# Patient Record
Sex: FEMALE | Race: BLACK OR AFRICAN AMERICAN | Employment: UNEMPLOYED | URBAN - METROPOLITAN AREA
[De-identification: names, ages, dates, MRNs, and addresses within clinical notes are randomized per-mention and may not be internally consistent; named-entity substitution may affect disease eponyms.]

---

## 2019-10-10 ENCOUNTER — HOSPITAL ENCOUNTER (OUTPATIENT)
Age: 39
Discharge: HOME | End: 2019-10-12

## 2019-10-10 ENCOUNTER — OFFICE VISIT (OUTPATIENT)
Dept: OBGYN | Age: 39
End: 2019-10-10

## 2019-10-10 VITALS
BODY MASS INDEX: 30.64 KG/M2 | SYSTOLIC BLOOD PRESSURE: 135 MMHG | HEART RATE: 75 BPM | HEIGHT: 59 IN | DIASTOLIC BLOOD PRESSURE: 80 MMHG | WEIGHT: 152 LBS

## 2019-10-10 DIAGNOSIS — R10.2 PELVIC PAIN: ICD-10-CM

## 2019-10-10 DIAGNOSIS — Z12.4 SCREENING FOR CERVICAL CANCER: ICD-10-CM

## 2019-10-10 DIAGNOSIS — Z12.31 ENCOUNTER FOR SCREENING MAMMOGRAM FOR BREAST CANCER: ICD-10-CM

## 2019-10-10 DIAGNOSIS — Z01.419 WOMEN'S ANNUAL ROUTINE GYNECOLOGICAL EXAMINATION: Primary | ICD-10-CM

## 2019-10-10 PROCEDURE — 99203 OFFICE O/P NEW LOW 30 MIN: CPT | Performed by: OBSTETRICS & GYNECOLOGY

## 2019-10-10 PROCEDURE — G0123 SCREEN CERV/VAG THIN LAYER: HCPCS

## 2019-10-10 PROCEDURE — 99386 PREV VISIT NEW AGE 40-64: CPT | Performed by: OBSTETRICS & GYNECOLOGY

## 2019-10-22 ENCOUNTER — HOSPITAL ENCOUNTER (OUTPATIENT)
Dept: ULTRASOUND IMAGING | Age: 39
Discharge: HOME | End: 2019-10-24

## 2019-10-22 DIAGNOSIS — R10.2 PELVIC PAIN: ICD-10-CM

## 2019-10-22 PROCEDURE — 76830 TRANSVAGINAL US NON-OB: CPT

## 2019-10-22 PROCEDURE — 76856 US EXAM PELVIC COMPLETE: CPT

## 2019-10-24 ENCOUNTER — OFFICE VISIT (OUTPATIENT)
Dept: OBGYN | Age: 39
End: 2019-10-24

## 2019-10-24 VITALS
SYSTOLIC BLOOD PRESSURE: 116 MMHG | DIASTOLIC BLOOD PRESSURE: 83 MMHG | HEART RATE: 72 BPM | WEIGHT: 154 LBS | BODY MASS INDEX: 31.1 KG/M2

## 2019-10-24 DIAGNOSIS — N93.9 ABNORMAL UTERINE BLEEDING: Primary | ICD-10-CM

## 2019-10-24 PROCEDURE — 99213 OFFICE O/P EST LOW 20 MIN: CPT | Performed by: OBSTETRICS & GYNECOLOGY

## 2019-10-24 PROCEDURE — 99212 OFFICE O/P EST SF 10 MIN: CPT | Performed by: OBSTETRICS & GYNECOLOGY

## 2019-11-19 ENCOUNTER — OFFICE VISIT (OUTPATIENT)
Dept: FAMILY MEDICINE CLINIC | Age: 39
End: 2019-11-19

## 2019-11-19 VITALS
BODY MASS INDEX: 28.89 KG/M2 | SYSTOLIC BLOOD PRESSURE: 110 MMHG | HEIGHT: 61 IN | OXYGEN SATURATION: 98 % | DIASTOLIC BLOOD PRESSURE: 74 MMHG | HEART RATE: 100 BPM | WEIGHT: 153 LBS | TEMPERATURE: 97.8 F

## 2019-11-19 DIAGNOSIS — J06.9 UPPER RESPIRATORY TRACT INFECTION, UNSPECIFIED TYPE: Primary | ICD-10-CM

## 2019-11-19 DIAGNOSIS — H66.003 NON-RECURRENT ACUTE SUPPURATIVE OTITIS MEDIA OF BOTH EARS WITHOUT SPONTANEOUS RUPTURE OF TYMPANIC MEMBRANES: ICD-10-CM

## 2019-11-19 PROCEDURE — 99213 OFFICE O/P EST LOW 20 MIN: CPT | Performed by: PHYSICIAN ASSISTANT

## 2019-11-19 RX ORDER — AMOXICILLIN 875 MG/1
875 TABLET, COATED ORAL 2 TIMES DAILY
Qty: 20 TABLET | Refills: 0 | Status: SHIPPED | OUTPATIENT
Start: 2019-11-19 | End: 2019-11-29

## 2019-11-19 ASSESSMENT — ENCOUNTER SYMPTOMS
SORE THROAT: 0
SHORTNESS OF BREATH: 0
BACK PAIN: 0
PHOTOPHOBIA: 0
VOMITING: 0
NAUSEA: 0
DIARRHEA: 0
ABDOMINAL PAIN: 0
COUGH: 1

## 2019-12-11 ENCOUNTER — HOSPITAL ENCOUNTER (OUTPATIENT)
Dept: GENERAL RADIOLOGY | Age: 39
Discharge: HOME | End: 2019-12-13

## 2019-12-11 DIAGNOSIS — Z12.31 ENCOUNTER FOR SCREENING MAMMOGRAM FOR BREAST CANCER: ICD-10-CM

## 2019-12-11 PROCEDURE — 77063 BREAST TOMOSYNTHESIS BI: CPT

## 2019-12-17 DIAGNOSIS — R92.2 DENSE BREAST TISSUE ON MAMMOGRAM: Primary | ICD-10-CM

## 2020-03-19 ENCOUNTER — HOSPITAL ENCOUNTER (OUTPATIENT)
Dept: GENERAL RADIOLOGY | Age: 40
Discharge: HOME | End: 2020-03-21

## 2020-03-19 PROCEDURE — 76641 ULTRASOUND BREAST COMPLETE: CPT

## 2020-03-25 ENCOUNTER — OFFICE VISIT (OUTPATIENT)
Dept: INTERNAL MEDICINE | Age: 40
End: 2020-03-25

## 2020-03-25 ENCOUNTER — HOSPITAL ENCOUNTER (OUTPATIENT)
Age: 40
Discharge: HOME | End: 2020-03-25

## 2020-03-25 VITALS
DIASTOLIC BLOOD PRESSURE: 82 MMHG | BODY MASS INDEX: 28.89 KG/M2 | SYSTOLIC BLOOD PRESSURE: 119 MMHG | HEART RATE: 82 BPM | RESPIRATION RATE: 16 BRPM | TEMPERATURE: 98.1 F | HEIGHT: 61 IN | WEIGHT: 153 LBS

## 2020-03-25 LAB
HBA1C MFR BLD: 6 %
TSH SERPL DL<=0.05 MIU/L-ACNC: 2.77 UIU/ML (ref 0.27–4.2)

## 2020-03-25 PROCEDURE — 93010 ELECTROCARDIOGRAM REPORT: CPT | Performed by: INTERNAL MEDICINE

## 2020-03-25 PROCEDURE — 84443 ASSAY THYROID STIM HORMONE: CPT

## 2020-03-25 PROCEDURE — 99204 OFFICE O/P NEW MOD 45 MIN: CPT | Performed by: INTERNAL MEDICINE

## 2020-03-25 PROCEDURE — 83036 HEMOGLOBIN GLYCOSYLATED A1C: CPT | Performed by: INTERNAL MEDICINE

## 2020-03-25 PROCEDURE — 93005 ELECTROCARDIOGRAM TRACING: CPT | Performed by: INTERNAL MEDICINE

## 2020-03-25 PROCEDURE — 99202 OFFICE O/P NEW SF 15 MIN: CPT | Performed by: INTERNAL MEDICINE

## 2020-03-25 PROCEDURE — 36415 COLL VENOUS BLD VENIPUNCTURE: CPT

## 2020-03-25 RX ORDER — ALBUTEROL SULFATE 90 UG/1
2 AEROSOL, METERED RESPIRATORY (INHALATION) 4 TIMES DAILY PRN
Qty: 1 INHALER | Refills: 0 | Status: SHIPPED
Start: 2020-03-25 | End: 2020-04-29 | Stop reason: SDUPTHER

## 2020-03-25 RX ORDER — LEVOTHYROXINE SODIUM 0.1 MG/1
100 TABLET ORAL DAILY
Qty: 30 TABLET | Refills: 0 | Status: SHIPPED
Start: 2020-03-25 | End: 2020-04-29 | Stop reason: SDUPTHER

## 2020-03-25 RX ORDER — OMEPRAZOLE 40 MG/1
40 CAPSULE, DELAYED RELEASE ORAL DAILY
Qty: 45 CAPSULE | Refills: 0 | Status: SHIPPED
Start: 2020-03-25 | End: 2020-04-29 | Stop reason: SDUPTHER

## 2020-03-25 RX ORDER — ALBUTEROL SULFATE 90 UG/1
2 AEROSOL, METERED RESPIRATORY (INHALATION) 4 TIMES DAILY PRN
Qty: 1 INHALER | Refills: 1 | Status: SHIPPED
Start: 2020-03-25 | End: 2020-03-25

## 2020-03-25 SDOH — ECONOMIC STABILITY: INCOME INSECURITY: HOW HARD IS IT FOR YOU TO PAY FOR THE VERY BASICS LIKE FOOD, HOUSING, MEDICAL CARE, AND HEATING?: SOMEWHAT HARD

## 2020-03-25 SDOH — ECONOMIC STABILITY: FOOD INSECURITY: WITHIN THE PAST 12 MONTHS, THE FOOD YOU BOUGHT JUST DIDN'T LAST AND YOU DIDN'T HAVE MONEY TO GET MORE.: NEVER TRUE

## 2020-03-25 SDOH — ECONOMIC STABILITY: FOOD INSECURITY: WITHIN THE PAST 12 MONTHS, YOU WORRIED THAT YOUR FOOD WOULD RUN OUT BEFORE YOU GOT MONEY TO BUY MORE.: OFTEN TRUE

## 2020-03-25 ASSESSMENT — ENCOUNTER SYMPTOMS
ALLERGIC/IMMUNOLOGIC NEGATIVE: 1
GASTROINTESTINAL NEGATIVE: 1
SHORTNESS OF BREATH: 1
EYES NEGATIVE: 1
VOICE CHANGE: 1

## 2020-03-25 ASSESSMENT — PATIENT HEALTH QUESTIONNAIRE - PHQ9
SUM OF ALL RESPONSES TO PHQ QUESTIONS 1-9: 0
SUM OF ALL RESPONSES TO PHQ9 QUESTIONS 1 & 2: 0
1. LITTLE INTEREST OR PLEASURE IN DOING THINGS: 0
2. FEELING DOWN, DEPRESSED OR HOPELESS: 0
SUM OF ALL RESPONSES TO PHQ QUESTIONS 1-9: 0

## 2020-03-25 NOTE — PATIENT INSTRUCTIONS
por trejo uso de esta información. Patient Education        Hipotiroidismo: Instrucciones de cuidado  Hypothyroidism: Care Instructions  Instrucciones de cuidado    Usted tiene hipotiroidismo, lo que significa que trejo organismo no está fabricando suficiente hormona tiroidea. Esta hormona ayuda a que trejo cuerpo use la energía. Si trejo nivel de hormona tiroidea está bajo, usted podría sentirse cansado, estar estreñido, tener aumento de presión arterial, o tener piel seca o problemas de [place]. También podría tener frío con facilidad, aun cuando el clima sea caluroso. Las mujeres con bajo nivel de tiroides quizás experimenten períodos menstruales abundantes. Se [place] un análisis de edward para detectar trejo nivel de hormona estimulante de tiroides (TSH, por bjorn siglas en inglés) y comprobar el hipotiroidismo. Un nivel alto de TSH podría significar que usted tiene [address] de tiroides bajo. Cuando trejo cuerpo no está produciendo suficiente hormona tiroidea, el nivel de TSH se eleva tratando de hacer que el cuerpo [address]. El tratamiento para el hipotiroidismo es thaddeus pastillas de hormona tiroidea. Mechelle Bur a sentirse mejor en 1 a 2 semanas. Vilma pueden pasar varios meses para oneil cambios en el nivel de TSH. Necesita consultas regulares con trejo médico para asegurarse de que esté tomando la dosis correcta de [place]. La mayoría de las personas necesitarán tratamiento por el steph de bjorn vidas. Nelli necesitará consultar regularmente al médico para hacerse análisis de [place] y asegurarse de que esté respondiendo prince. La atención de seguimiento es jeri parte clave de trejo tratamiento y seguridad. Asegúrese de hacer y acudir a todas las citas, y llame a trejo médico si está teniendo problemas. También es jeri buena idea saber los resultados de bjorn exámenes y mantener jeri lista de los medicamentos que stephanie. ¿Cómo puede cuidarse en el hogar?   · Media trejo medicamento de hormona tiroidea exactamente [employer]	[place] fue indicado. Llame a trejo médico si griselda estar teniendo problemas con trejo medicamento. La mayoría de las personas no tienen efectos secundarios si harmony con regularidad la cantidad correcta de medicamento. ? Ceiba el medicamento 30 minutos antes del desayuno y no lo lino junto con calcio, vitaminas o bryan.  ? No intente thaddeus dosis adicionales de trejo medicamento para la tiroides. Eso no lo ayuda a sentirse mejor más pronto y podría provocar efectos secundarios. ? Si olvida thaddeus jeri dosis, NO tome jeri dosis doble del medicamento. Ceiba la dosis normal al día siguiente. · Infórmele a trejo médico sobre todos los productos recetados, herbales o de venta jarvis que tome. · Cuídese. Siga jeri dieta saludable, duerma lo suficiente y lino ejercicio con regularidad. ¿Cuándo debe pedir ayuda? Llame al 911 en cualquier momento que considere que necesita atención de emergencia. Por ejemplo, llame si:    · Se desmayó (perdió el conocimiento).     · Tiene graves dificultades para respirar.     · Tiene un ritmo cardíaco demasiado bajo (menos de 60 latidos por minuto).     · Tiene jeri temperatura corporal baja (95°F [35°C] o waylon).    Llame a trejo médico ahora mismo o busque atención médica inmediata si:    · Se siente cansado, perezoso o débil.     · Tiene dificultades para recordar cosas o concentrarse.     · No empieza a sentirse mejor 2 semanas después de shine empezado a thaddeus el medicamento.    Preste especial atención a los cambios en trejo iván y asegúrese de comunicarse con trejo médico si tiene algún problema. ¿Dónde puede encontrar más información en inglés? Genice Danielle a [email or web address] e ingrese a trejo cuenta de MyChart. Dinora Garcia C371 en el Luis Pierre \"Search Health Information\" para más información (en inglés) sobre \"Hipotiroidismo: Instrucciones de cuidado. \"     Si no tiene jeri cuenta, lino jessica en el enlace \"Sign Up Now\".   Revisado: 28 julio, 2019Versión del contenido: 12.4  © 4494-5566 Healthwise, para ir al baño, llame a la enfermera antes de que tenga que ir urgentemente. Si recibe líquidos por vena (IV), mustapha vez tenga que ir al baño con más frecuencia. Cosas que el personal del hospital puede hacer para prevenir las caídas   Mantener cerca de usted los artículos necesarios. Trejo teléfono, la lupillo o el botón para llamar a la enfermera y cualquier cosa que necesite para ayudarse a caminar deberían estar cerca de usted. Mantener trejo cama baja y [place]. Trejo cama debería estar lo suficientemente baja mary para que no tenga problemas para levantarse. Las chet de trejo cama deben estar trabadas para que la cama no pueda moverse. Explicarle lo que es seguro. Trejo médico o enfermera le dirá lo que puede hacer con seguridad y con qué frecuencia necesita levantarse y andar. Mantener trejo habitación limpia y [place]. Trejo habitación no debería tener zonas mojadas ni resbaladizas. No debería shine nada obstruyendo el mario al baño o al pasillo. Iluminar la habitación. Trejo habitación debería tener jeri buena iluminación. Asegúrese de que haya jeri lupillo de noche en trejo baño. Revisado: 21 agosto, 2019Versión del contenido: 12.4  © 7287-9718 Healthwise, Incorporated. Las instrucciones de cuidado fueron adaptadas bajo licencia por [facility] ([facility]). Si usted tiene Alva Kingfisher afección médica o sobre estas instrucciones, siempre pregunte a trejo profesional de iván. WiiiWaaa, [employer] niega toda garantía o responsabilidad por trejo uso de esta información.

## 2020-03-25 NOTE — PROGRESS NOTES
[facility]  InternalMedicine Residency Program  ACC Note      SUBJECTIVE:  CC: had concerns including New Patient; Pharyngitis (pain in throat for 5 months with change in voice); and Hypothyroidism (on 100mcgs of levothyroxine daily). HPI:Jose Lucas presented to the [facility] for a routine visit. PMHx of hypothyroidsim, with family hx of DM in both parents and brothers, came with complains of change in the voice since November 2019, tried abx from [employer], did not benefit her, and she also has reflux, and epigastric pain, her Hba1c today is 6.0, explained her benefits of weight loss and exercise. Review of Systems   Constitutional: Negative. HENT: Positive for voice change. Eyes: Negative. Respiratory: Positive for shortness of breath. Cardiovascular: Positive for palpitations. Gastrointestinal: Negative. Endocrine: Negative. Genitourinary: Negative. Musculoskeletal: Negative. Allergic/Immunologic: Negative. Neurological: Negative. Hematological: Negative. Psychiatric/Behavioral: Negative. Current Outpatient Medications on File Prior to Visit   Medication Sig Dispense Refill    acetaminophen (TYLENOL) 500 MG tablet Take 1 tablet by mouth every 6 hours as needed for Pain Maximum dose- 8 tablets/24 hours. (Patient not taking: Reported on 3/25/2020) 120 tablet 2     No current facility-administered medications on file prior to visit. OBJECTIVE:    VS: /82 (Site: Left Upper Arm, Position: Sitting, Cuff Size: Medium Adult)   Pulse 82   Temp 98.1 °F (36.7 °C) (Oral)   Resp 16   Ht 5' 1\" (1.549 m)   Wt 153 lb (69.4 kg)   BMI 28.91 kg/m²   Physical Exam  HENT:      Head: Normocephalic. Nose: Nose normal.   Eyes:      Conjunctiva/sclera: Conjunctivae normal.   Cardiovascular:      Rate and Rhythm: Normal rate. Pulmonary:      Effort: Pulmonary effort is normal.      Breath sounds: Normal breath sounds.    Abdominal: General: Bowel sounds are normal.   Musculoskeletal: Normal range of motion. Skin:     General: Skin is warm and dry. Neurological:      General: No focal deficit present. Mental Status: She is alert and oriented to person, place, and time. Psychiatric:         Mood and Affect: Mood normal.         Thought Content: Thought content normal.         ASSESSMENT/PLAN:    I have reviewed all pertient PMHx, PSHx, FamHx, Social Hx, medications, and allergies andupdated history as appropriate. Kiki Mancini was seen today for new patient, pharyngitis and hypothyroidism. Diagnoses and all orders for this visit:    Screening for diabetes mellitus  -     POCT glycosylated hemoglobin (Hb A1C)  -explained weight loss and exercise    Hypothyroidism, unspecified type  -     levothyroxine (LEVOTHROID) 100 MCG tablet; Take 1 tablet by mouth daily  -     TSH; Future  -will follow TSH and adjust dose as needed    Gastroesophageal reflux disease without esophagitis/hoarseness of voice  -     omeprazole (PRILOSEC) 40 MG delayed release capsule; Take 1 capsule by mouth daily  -will follow in 6 weeks    Palpitations  -     EKG 12 Lead  -EKG with in normal limitis    SOB (shortness of breath) on exertion  -     albuterol sulfate HFA (VENTOLIN HFA) 108 (90 Base) MCG/ACT inhaler;  Inhale 2 puffs into the lungs 4 times daily as needed for Wheezing      HCM  hba1c - 6    RTC:     I have reviewed my findings andrecommendations with Roddy Sam and Dr Pillo Slade M.D., PGY1  3/25/2020 9:49 AM

## 2020-03-25 NOTE — PROGRESS NOTES
Pt screened positive for SDOH related to financial strain and or food insecurity and declined further contact for assessment/resources.  Khmer interpretor Shikha Weston present for interpreting for the doctor and the nurse  Medication changes reviewed with patient  Printed lab script given to pt with instructions  Patient given instructions. Understanding verbalized.  Fu appointment scheduled and reviewed with pt

## 2020-04-29 ENCOUNTER — OFFICE VISIT (OUTPATIENT)
Dept: INTERNAL MEDICINE | Age: 40
End: 2020-04-29

## 2020-04-29 VITALS
TEMPERATURE: 98.3 F | BODY MASS INDEX: 30.66 KG/M2 | DIASTOLIC BLOOD PRESSURE: 86 MMHG | SYSTOLIC BLOOD PRESSURE: 121 MMHG | HEART RATE: 78 BPM | RESPIRATION RATE: 18 BRPM | WEIGHT: 152.1 LBS | HEIGHT: 59 IN | OXYGEN SATURATION: 100 %

## 2020-04-29 PROCEDURE — 99214 OFFICE O/P EST MOD 30 MIN: CPT | Performed by: PHYSICAL MEDICINE & REHABILITATION

## 2020-04-29 PROCEDURE — 99212 OFFICE O/P EST SF 10 MIN: CPT | Performed by: PHYSICAL MEDICINE & REHABILITATION

## 2020-04-29 RX ORDER — OMEPRAZOLE 40 MG/1
40 CAPSULE, DELAYED RELEASE ORAL DAILY
Qty: 90 CAPSULE | Refills: 0 | Status: SHIPPED | OUTPATIENT
Start: 2020-04-29 | End: 2020-07-02 | Stop reason: SDUPTHER

## 2020-04-29 RX ORDER — ALBUTEROL SULFATE 90 UG/1
2 AEROSOL, METERED RESPIRATORY (INHALATION) 4 TIMES DAILY PRN
Qty: 1 INHALER | Refills: 0 | Status: SHIPPED | OUTPATIENT
Start: 2020-04-29 | End: 2020-12-22 | Stop reason: ALTCHOICE

## 2020-04-29 RX ORDER — LEVOTHYROXINE SODIUM 0.1 MG/1
100 TABLET ORAL DAILY
Qty: 90 TABLET | Refills: 0 | Status: SHIPPED | OUTPATIENT
Start: 2020-04-29 | End: 2020-07-02 | Stop reason: SDUPTHER

## 2020-07-02 ENCOUNTER — OFFICE VISIT (OUTPATIENT)
Dept: INTERNAL MEDICINE | Age: 40
End: 2020-07-02

## 2020-07-02 VITALS
OXYGEN SATURATION: 99 % | WEIGHT: 151 LBS | SYSTOLIC BLOOD PRESSURE: 112 MMHG | BODY MASS INDEX: 30.44 KG/M2 | RESPIRATION RATE: 16 BRPM | TEMPERATURE: 97.8 F | HEART RATE: 78 BPM | HEIGHT: 59 IN | DIASTOLIC BLOOD PRESSURE: 76 MMHG

## 2020-07-02 PROCEDURE — 99212 OFFICE O/P EST SF 10 MIN: CPT | Performed by: INTERNAL MEDICINE

## 2020-07-02 PROCEDURE — 99214 OFFICE O/P EST MOD 30 MIN: CPT | Performed by: INTERNAL MEDICINE

## 2020-07-02 RX ORDER — LEVOTHYROXINE SODIUM 0.1 MG/1
100 TABLET ORAL DAILY
Qty: 90 TABLET | Refills: 0 | Status: SHIPPED
Start: 2020-07-02 | End: 2020-10-29 | Stop reason: SDUPTHER

## 2020-07-02 RX ORDER — OMEPRAZOLE 40 MG/1
40 CAPSULE, DELAYED RELEASE ORAL PRN
Qty: 90 CAPSULE | Refills: 0 | Status: SHIPPED
Start: 2020-07-02 | End: 2020-10-29 | Stop reason: SDUPTHER

## 2020-07-02 ASSESSMENT — ENCOUNTER SYMPTOMS
COUGH: 0
CONSTIPATION: 0
TROUBLE SWALLOWING: 0
CHOKING: 0
VOICE CHANGE: 0

## 2020-07-02 NOTE — PATIENT INSTRUCTIONS
Have lab work done prior to next appt. Only take Prilosec daily if needed. Continue other medications as ordered. Follow up as scheduled. Please call with any questions or concerns.    Kika Valdivia RN

## 2020-07-02 NOTE — PROGRESS NOTES
Subjective:      Patient ID: Erica Donnelly is a 41 y.o. female with a significant past medical history of hypothyroidism (2006) on 100ug of Levothyroxine. She is currently in the office for her 2 months follow up. HPI   The patient speaks Togolese and Grady Gipson served as the  to help with the patient interview. The patient is currently doing good, apart from a single episode of difficulty breathing while [place] at [employer] last Sunday. She felt a mild chest tightness, along with breathlessness which resolved after taking deep breaths and using her Albuterol inhaler. This is the only such episode that she has ever had. She also mentions swelling in her right foot along with numbness. No tingling. Present since the last 3 days. -her previous complaint of breathlessness has mostly resolved apart from the one episode as mentioned above. She uses her Albuterol inhaler sparingly.    -Her previous complaint of dyspepsia (disgnosed as GERD), is much better following use of Omeprazole 40mg daily     -Her previous complaint of palpitations has resolved. No chest pain. Single episode of chest tightness as mentioned above. Review of Systems   Constitutional: Negative for appetite change, chills, fatigue, fever and unexpected weight change. No weight loss or gain   HENT: Negative for trouble swallowing and voice change. No difficulty swallowing either solids or liquids. Eyes: Positive for visual disturbance. Blurriness present while reading books/newspaper   Respiratory: Negative for cough and choking. Cardiovascular: Positive for leg swelling. Negative for chest pain and palpitations. Gastrointestinal: Negative for constipation. Endocrine: Negative for cold intolerance and heat intolerance. No excessive sweatiness   Neurological: Positive for numbness. Negative for tremors.         Complaints of numbness, heaviness, and swelling of the right foot since the last 3 days      OBGYN  -menstrual cycles- have been irregular since she was 10 yo  -2 cesarians, GDM in both the pregnancies    Allergies: none    OBGYN:   Famil history: no h/o thyroid disease. Three brothers are all diabetic. Social: non-smoker, no alcohol intake, no drug abuse      Objective:   Physical Exam  Constitutional:       Appearance: Normal appearance. Eyes:      Extraocular Movements: Extraocular movements intact. Conjunctiva/sclera: Conjunctivae normal.   Neck:      Musculoskeletal: Normal range of motion and neck supple. No muscular tenderness. Comments: Inspection: no swelling   Palpation: no local rise of temperature, no tenderness, no swelling, thyroid appears normal  Cardiovascular:      Rate and Rhythm: Normal rate and regular rhythm. Heart sounds: Normal heart sounds. No murmur. Comments: Pulses were difficult to palpate due to presence of mild edema-both in the upper and lower limbs  Pulmonary:      Effort: Pulmonary effort is normal.      Breath sounds: Normal breath sounds. Musculoskeletal:      Right lower le+ Pitting Edema present. Left lower le+ Pitting Edema present. Comments: Trace edema appears to be present from mid-forearm to the fingers   Neurological:      Mental Status: She is alert. Gait: Gait is intact. Deep Tendon Reflexes:      Reflex Scores:       Bicep reflexes are 2+ on the right side and 2+ on the left side. Patellar reflexes are 3+ on the right side and 2+ on the left side. Assessment:       1. Hypothyroidism:  Patient has been taking Levothyroxine since the past 12 years regularly. From history, physical exam and last TSH (2.77), she appears to be euthyroid. 2. SOB:  Well controlled, isolated incidence last , managed well with Albuterol. Not known to be asthmatic. Symptoms appear to have resolved.     3. GERD/Dyspepsia:   Patient does not complain of these symptoms currently, and Omeprazole has been helpful. 4. Palpitations:  Symptoms have resolved and patient does not complain of this any more. ECG was done on the last visit for assessment of the symptom on the last visit. The results of the ECG are normal, and have been communicated and discussed with the patient. 5. Pedal edema:  Cardiac etiology is ruled out based on history, and examination. Could be possibly due to hypothyroidism, however, clinically the patient appears to be euthyroid. Plan:       1. Hypothyroidism:  Patient to continue taking Levothyroxine as before. CBC, CMP to be done to assess all-round health, as her last test was done 4 years back. 2. SOB:  Patient doesn't require any further investigations. She can continue to take Albuterol inhaler as needed. If she has repeated episodes, further investigations can be done. 3. GERD/Dyspepsia:  Since the patient's symptoms have resolved, she can continue to take Omeprazole (Prilosec) on an as needed basis. 4. Palpitations:  No further investigations at this point, as her symptoms have resolved and the ECG done at the last visit was normal.    5. Pedal edema:  TSH to be done to confirm euthyroid status. Symptoms to be followed up on the next office visit. Next follow up: in 3 months-- for yearly flu shot, and to discuss results of blood tests ordered on today's visit.          Sergo Marion MD

## 2020-07-02 NOTE — PROGRESS NOTES
Attending Physician Statement  I have discussed the case, including pertinent history and exam findings with the resident. I have seen and examined the patient and the key elements of the encounter have been performed by me. I agree with the assessment, plan and orders as documented by the resident. Pt presented for the follow up of hypothyroidism, reported one episode of shortness of breath, used one dose of albuterol which relieved the symptom. CO palpitation was reported previously with normal EKG findings reviewed. Also has dyspepsia, has been on PPI, recommended to change as PRN. Need CBC, TSH, and CMP and follow up 2-3 months.   Melani Hairston

## 2020-10-29 RX ORDER — LEVOTHYROXINE SODIUM 0.1 MG/1
100 TABLET ORAL DAILY
Qty: 90 TABLET | Refills: 1 | Status: SHIPPED
Start: 2020-10-29 | End: 2022-08-23 | Stop reason: SDUPTHER

## 2020-10-29 RX ORDER — OMEPRAZOLE 40 MG/1
40 CAPSULE, DELAYED RELEASE ORAL PRN
Qty: 90 CAPSULE | Refills: 0 | Status: SHIPPED
Start: 2020-10-29 | End: 2021-03-16 | Stop reason: SDUPTHER

## 2020-11-10 ENCOUNTER — OFFICE VISIT (OUTPATIENT)
Dept: FAMILY MEDICINE CLINIC | Age: 40
End: 2020-11-10

## 2020-11-10 VITALS
BODY MASS INDEX: 29.06 KG/M2 | WEIGHT: 148 LBS | RESPIRATION RATE: 20 BRPM | HEIGHT: 60 IN | DIASTOLIC BLOOD PRESSURE: 74 MMHG | HEART RATE: 84 BPM | OXYGEN SATURATION: 98 % | SYSTOLIC BLOOD PRESSURE: 120 MMHG | TEMPERATURE: 98.5 F

## 2020-11-10 PROCEDURE — 90471 IMMUNIZATION ADMIN: CPT | Performed by: INTERNAL MEDICINE

## 2020-11-10 PROCEDURE — 90686 IIV4 VACC NO PRSV 0.5 ML IM: CPT | Performed by: INTERNAL MEDICINE

## 2020-11-10 PROCEDURE — 99213 OFFICE O/P EST LOW 20 MIN: CPT | Performed by: INTERNAL MEDICINE

## 2020-11-10 PROCEDURE — 36415 COLL VENOUS BLD VENIPUNCTURE: CPT | Performed by: INTERNAL MEDICINE

## 2020-11-10 RX ORDER — IBUPROFEN 200 MG
400 TABLET ORAL EVERY 6 HOURS PRN
COMMUNITY
End: 2021-10-19 | Stop reason: DRUGHIGH

## 2020-11-10 NOTE — PROGRESS NOTES
Patient information obtained with assistance from Dyan Monet [place] . Discharge instructions reviewed by Dr. Bong Box with assistance from Tori Peres.     AVS given

## 2020-11-10 NOTE — PROGRESS NOTES
HPI:  Patient comes in today for follow-up of chronic medical issues. Hypothyroidism - on levothyroxine. No issues. GERD - continues on omeprazole. Pain in chest that radiates to the back - 3 weeks in duration. Took tylenol today. Wakes up every morning with this pain. 7/10 in severity. + pain in hands as well. No SOB. No dizziness or lightheadedness along with the pain. Feels heat and burning in the back. No palpitations. Review of Systems  Review of Systems - as above    PE:  VS:  /74 (Site: Left Upper Arm)   Pulse 84   Temp 98.5 °F (36.9 °C) (Oral)   Resp 20   Ht 4' 11.5\" (1.511 m)   Wt 148 lb (67.1 kg)   SpO2 98%   BMI 29.39 kg/m²   Physical Exam   Lungs:  CTA B  Neck:   No carotid bruits appreciated B.   CVS:  +s1/s2 without m/g/r appreciated. + reproduction of pain over xyphoid process to palpation. Abd:  + BS, NTND, No renal or aortic bruits   Extr:  2+ DP/PT pulses B, no pitting edema    Assessment/Plan:  Juan Francisco Gardner was seen today for other and pain. Diagnoses and all orders for this visit:    Hypothyroidism, unspecified type - stable   Continue current levothyroxine dose. Rhetta Imelda - new onset   Treat with NSAIDs for now. Can take up to 800 mg three times a day if needed for the next week. X-ray if no resolution of symptoms. GERD - stable    Continue PPI for now. Health maintenance -   -     INFLUENZA, QUADV, 3 YRS AND OLDER, IM PF, PREFILL SYR OR SDV, 0.5ML (AFLURIA QUADV, PF)    RTC in 6 months or sooner if pain does not resolve.      Roseann Burris MD   11/10/2020

## 2020-12-07 NOTE — PROGRESS NOTES
HPI:  Patient comes in today for follow-up for chest pain. Was treated for Xyphoidynia. Was placed on NSAIDs for one week at 800 mg three times a day dose. Was to get X-ray if no improvement of pain. Took the medication for one week. This helped and now pain is returned now that the medication has stopped. Feels like she needs to cough. Was taking 800 mg three times a day of ibuprofen. Review of Systems  Review of Systems    PE:  VS:  /76 (Site: Left Upper Arm, Position: Sitting)   Pulse 78   Temp 98.2 °F (36.8 °C) (Oral)   Resp 20   Ht 4' 11.5\" (1.511 m)   Wt 148 lb 6.4 oz (67.3 kg)   SpO2 98%   BMI 29.47 kg/m²   Physical Exam  Lungs:  CTA B, + pain to palpation over xiphoid process as well as B to ribs in upper back. CVS:  +s1/s2 without m/g/r appreciated. Extr:  No erythema or swelling of hands. No swelling of knees B. Assessment/Plan:    Intercostal pain - uncertain etiology with no mechanism of injury  -     Check XR CHEST STANDARD (2 VW); Future    Xiphoid pain - ongoing for some time. Will check labs to ensure no underlying rheumatologic process   -     Check IGOR; Future  -     Check RHEUMATOID FACTOR; Future  -     Check XR CHEST STANDARD (2 VW); Future    Resume ibuprofen 400 - 600 mg three times daily. Patient to return if pain not improved. May need to refer if no improvement.       Elva Fregoso MD  12/8/2020

## 2020-12-08 ENCOUNTER — OFFICE VISIT (OUTPATIENT)
Dept: FAMILY MEDICINE CLINIC | Age: 40
End: 2020-12-08

## 2020-12-08 VITALS
HEART RATE: 78 BPM | SYSTOLIC BLOOD PRESSURE: 102 MMHG | HEIGHT: 60 IN | RESPIRATION RATE: 20 BRPM | OXYGEN SATURATION: 98 % | WEIGHT: 148.4 LBS | TEMPERATURE: 98.2 F | BODY MASS INDEX: 29.13 KG/M2 | DIASTOLIC BLOOD PRESSURE: 76 MMHG

## 2020-12-08 DIAGNOSIS — R07.89 XIPHOID PAIN: ICD-10-CM

## 2020-12-08 PROCEDURE — 99213 OFFICE O/P EST LOW 20 MIN: CPT | Performed by: INTERNAL MEDICINE

## 2020-12-08 PROCEDURE — 36415 COLL VENOUS BLD VENIPUNCTURE: CPT | Performed by: INTERNAL MEDICINE

## 2020-12-08 NOTE — PROGRESS NOTES
Patient information obtained with assistance from Lizett Arias [facility] ([place]  and HILARY Pérez. Discharge instructions reviewed by Dr. Columba Maher.     ALBERTINAS given

## 2020-12-09 ENCOUNTER — HOSPITAL ENCOUNTER (OUTPATIENT)
Age: 40
Discharge: HOME | End: 2020-12-11

## 2020-12-09 ENCOUNTER — HOSPITAL ENCOUNTER (OUTPATIENT)
Dept: GENERAL RADIOLOGY | Age: 40
Discharge: HOME | End: 2020-12-11

## 2020-12-09 LAB
ANTI-NUCLEAR ANTIBODY (ANA): NEGATIVE
RHEUMATOID FACTOR: 13 IU/ML (ref 0–13)

## 2020-12-09 PROCEDURE — 71046 X-RAY EXAM CHEST 2 VIEWS: CPT

## 2020-12-22 ENCOUNTER — OFFICE VISIT (OUTPATIENT)
Dept: FAMILY MEDICINE CLINIC | Age: 40
End: 2020-12-22

## 2020-12-22 VITALS
WEIGHT: 148.4 LBS | SYSTOLIC BLOOD PRESSURE: 118 MMHG | HEIGHT: 60 IN | TEMPERATURE: 98.3 F | RESPIRATION RATE: 20 BRPM | HEART RATE: 78 BPM | DIASTOLIC BLOOD PRESSURE: 80 MMHG | BODY MASS INDEX: 29.13 KG/M2 | OXYGEN SATURATION: 98 %

## 2020-12-22 PROCEDURE — 99213 OFFICE O/P EST LOW 20 MIN: CPT | Performed by: INTERNAL MEDICINE

## 2020-12-22 NOTE — PROGRESS NOTES
HPI:  Patient comes in today for follow-up for chest pain. Was treated for Xyphoidynia. Was placed on NSAIDs for one week at 800 mg three times a day dose. Reports feeling better today without any complaint of pain. Pain was relieved on Sunday. Review of Systems  Review of Systems - as above    PE:  VS:  /80 (Site: Left Upper Arm, Position: Sitting)   Pulse 78   Temp 98.3 °F (36.8 °C) (Oral)   Resp 20   Ht 5' (1.524 m)   Wt 148 lb 6.4 oz (67.3 kg)   SpO2 98%   BMI 28.98 kg/m²   Physical Exam  Lungs:  CTA B  CVS:  +s1/s2 without m/g/r appreciated. No pain to palpation over xiphoid or rib cage. Abd:  + BS, NTND, No renal or aortic bruits   Extr:  2+ DP/PT pulses B, no pitting edema      Normal labs - RF/IGOR  Assessment/Plan:    Intercostal pain - resolved. Xiphoid pain - resolved   Monitor for recurrence. Patient will return in 3 months for check of TSH.      Adele Weber MD  12/23/2020

## 2020-12-22 NOTE — PROGRESS NOTES
Patient information obtained with assistance from [facility], [facility] and [facility] ([place] . Discharge instructions reviewed by Dr. Vipin Brannon with assistance from Kelsey and Trevor Plunkett.     FABIEN given

## 2021-03-16 ENCOUNTER — OFFICE VISIT (OUTPATIENT)
Dept: FAMILY MEDICINE CLINIC | Age: 41
End: 2021-03-16

## 2021-03-16 VITALS
SYSTOLIC BLOOD PRESSURE: 124 MMHG | RESPIRATION RATE: 20 BRPM | HEIGHT: 60 IN | DIASTOLIC BLOOD PRESSURE: 80 MMHG | OXYGEN SATURATION: 98 % | HEART RATE: 86 BPM | WEIGHT: 153.6 LBS | TEMPERATURE: 98.2 F | BODY MASS INDEX: 30.15 KG/M2

## 2021-03-16 DIAGNOSIS — S46.819A STRAIN OF TRAPEZIUS MUSCLE, UNSPECIFIED LATERALITY, INITIAL ENCOUNTER: ICD-10-CM

## 2021-03-16 DIAGNOSIS — K21.9 GASTROESOPHAGEAL REFLUX DISEASE WITHOUT ESOPHAGITIS: ICD-10-CM

## 2021-03-16 DIAGNOSIS — M79.642 HAND PAIN, LEFT: Primary | ICD-10-CM

## 2021-03-16 PROCEDURE — 99213 OFFICE O/P EST LOW 20 MIN: CPT | Performed by: INTERNAL MEDICINE

## 2021-03-16 RX ORDER — OMEPRAZOLE 40 MG/1
40 CAPSULE, DELAYED RELEASE ORAL PRN
Qty: 90 CAPSULE | Refills: 1 | Status: SHIPPED
Start: 2021-03-16 | End: 2021-10-19 | Stop reason: SDUPTHER

## 2021-03-16 RX ORDER — NAPROXEN 500 MG/1
500 TABLET ORAL 2 TIMES DAILY WITH MEALS
Qty: 60 TABLET | Refills: 0 | Status: SHIPPED | OUTPATIENT
Start: 2021-03-16 | End: 2021-04-20 | Stop reason: SDUPTHER

## 2021-03-16 ASSESSMENT — PATIENT HEALTH QUESTIONNAIRE - PHQ9
SUM OF ALL RESPONSES TO PHQ QUESTIONS 1-9: 0
1. LITTLE INTEREST OR PLEASURE IN DOING THINGS: 0
SUM OF ALL RESPONSES TO PHQ9 QUESTIONS 1 & 2: 0

## 2021-03-16 NOTE — PROGRESS NOTES
Patient information obtained with assistance from Rg Klein, . Discharge instructions reviewed by Dr. Oleksandr Tolbert with assistance from Rg Klein.     AVS given

## 2021-03-16 NOTE — PROGRESS NOTES
Internal Medicine Mobile Kumar Bell is a 41 y.o. female, presents to the mobile clinic with complaints of   Left pinky finger swelling and pain, and pain in upper back since a fall. S/P tubal ligation. inerpreter for this visit: Mary Shafer    Review of Systems   Constitutional: No fever, chills, fatigue, weight loss or gain, night sweats  Respiratory: No cough, dyspnea, wheezing, sputum, or hemoptysis  Cardiovascular: No chest pain, angina, dyspnea on exertion, orthopnea, PND   Gastrointestinal: No nausea, vomiting, diarrhea, abdominal pain, or blood per rectum  Genitourinary: No dysuria, nocturia, hesitancy, or incontinence  Musculoskeletal: , No numbness, or limited ROM. Edema at PIP joint left finger. Pain upper back bilaterally  Neurological:  No headache, dizziness, numbness, or weakness    Objective:   Physical Exam   /80 (Site: Left Upper Arm, Position: Sitting)   Pulse 86   Temp 98.2 °F (36.8 °C) (Oral)   Resp 20   Ht 5' (1.524 m)   Wt 153 lb 9.6 oz (69.7 kg)   SpO2 98%   BMI 30.00 kg/m²   General appearance: Alert, Awake, Oriented to Person, Place, and Time   Head: Normocephalic. No masses, lesions or tenderness noted. Oropharynx: Oropharynx clear with no exudate seen  Neck: Neck supple. No jugular venous distension, lymphadenopathy or thyromegaly. Trachea midline  Lungs: Lungs clear to auscultation bilaterally. No rhonchi, crackles or wheezes  Heart:  Regular rate and rhythm with auscultation of S1, S2. No rub, murmur or gallop  Abdomen: Soft, non-tender abdomen with bowel sounds in four quadrants. No hepatosplenomegaly or masses. Extremities: No edema, Peripheral pulses palpable in bilateral radial arteries and posterior tibial.   Musculoskeletal: Muscular strength appears intact. There is swelling at PIP joint left pinky finger. There is tenderness palpation trapezius area bilaterally. Skin: Warm and dry, no acute eczematous changes or pruritus. Neuro:   No

## 2021-03-23 ENCOUNTER — HOSPITAL ENCOUNTER (OUTPATIENT)
Age: 41
Discharge: HOME | End: 2021-03-25

## 2021-03-23 ENCOUNTER — HOSPITAL ENCOUNTER (OUTPATIENT)
Dept: GENERAL RADIOLOGY | Age: 41
Discharge: HOME | End: 2021-03-25

## 2021-03-23 DIAGNOSIS — M79.642 HAND PAIN, LEFT: ICD-10-CM

## 2021-03-23 PROCEDURE — 73120 X-RAY EXAM OF HAND: CPT

## 2021-04-16 NOTE — PROGRESS NOTES
[facility] [place] Fernando Tuttle is a 41 y.o. female, presents to the mobile clinic with complaints of hypothyroidism, and pain in shoulder and upper back bilaterally. The pain is worse when she works. Naprosyn helps the pain. She had fallopian tubes remived     for this visit:     Review of Systems   Constitutional: No fever, chills, fatigue, weight loss or gain, night sweats  Respiratory: No cough, dyspnea, wheezing, sputum, or hemoptysis  Cardiovascular: No chest pain, angina, dyspnea on exertion, orthopnea, PND   Gastrointestinal: No nausea, vomiting, diarrhea, abdominal pain, or blood per rectum  Genitourinary: No dysuria, nocturia, hesitancy, or incontinence  Musculoskeletal: No pain, numbness, or limited ROM  Neurological:  No headache, dizziness, numbness, or weakness    Objective:   Physical Exam   /80 (Site: Right Upper Arm, Position: Sitting)   Pulse 77   Temp 98.2 °F (36.8 °C) (Oral)   Resp 20   Ht 5' (1.524 m)   Wt 153 lb 12.8 oz (69.8 kg)   SpO2 98%   BMI 30.04 kg/m²   General appearance: Alert, Awake, Oriented to Person, Place, and Time   Head: Normocephalic. No masses, lesions or tenderness noted. Eyes: Conjunctivae appear normal, EOMI, PERRL. No scleral icterus or drainage. Ears: External ears normal, no otalgia or erythema. Nose/Sinuses: Nares normal. Septum midline. Mucosa normal. No drainage  Oropharynx: Oropharynx clear with no exudate seen  Neck: Neck supple. No jugular venous distension, lymphadenopathy or thyromegaly. Trachea midline  Lungs: Lungs clear to auscultation bilaterally. No rhonchi, crackles or wheezes  Heart:  Regular rate and rhythm with auscultation of S1, S2. No rub, murmur or gallop  Abdomen: Soft, non-tender abdomen with bowel sounds in four quadrants. No hepatosplenomegaly or masses.    Extremities: No edema, Peripheral pulses palpable in bilateral radial arteries and posterior tibial.   Musculoskeletal: Muscular strength appears intact. No joint effusion, tenderness, swelling or warmth. Skin: Warm and dry, no acute eczematous changes or pruritus. Neuro:  No focal motor or sensory deficits. CN II- XII intact. Assessment & Plan:   Smita Rodriguez was seen today for back pain, results and finger pain. Diagnoses and all orders for this visit:    Hypothyroidism, unspecified type  -     T4, Free; Future  -     Comprehensive Metabolic Panel; Future  -     CBC Auto Differential; Future    Gastroesophageal reflux disease without esophagitis    Screening for diabetes mellitus  -     POCT glycosylated hemoglobin (Hb A1C)    Strain of trapezius muscle, unspecified laterality, initial encounter    Finger pain, left  -     [facility], [place]    Bilateral shoulder pain, unspecified chronicity  -     XR SHOULDER LEFT (MIN 2 VIEWS); Future  -     XR SHOULDER RIGHT (MIN 2 VIEWS); Future    Acute bilateral thoracic back pain  -     XR THORACIC SPINE (2 VIEWS); Future    Other orders  -     naproxen (NAPROSYN) 500 MG tablet;  Take 1 tablet by mouth 2 times daily (with meals)            Return to clinic in     Pradeep Caballero M.D.,  4/20/2021

## 2021-04-20 ENCOUNTER — OFFICE VISIT (OUTPATIENT)
Dept: FAMILY MEDICINE CLINIC | Age: 41
End: 2021-04-20

## 2021-04-20 VITALS
BODY MASS INDEX: 30.19 KG/M2 | DIASTOLIC BLOOD PRESSURE: 80 MMHG | OXYGEN SATURATION: 98 % | HEART RATE: 77 BPM | HEIGHT: 60 IN | TEMPERATURE: 98.2 F | RESPIRATION RATE: 20 BRPM | SYSTOLIC BLOOD PRESSURE: 128 MMHG | WEIGHT: 153.8 LBS

## 2021-04-20 DIAGNOSIS — Z13.1 SCREENING FOR DIABETES MELLITUS: ICD-10-CM

## 2021-04-20 DIAGNOSIS — E03.9 HYPOTHYROIDISM, UNSPECIFIED TYPE: Primary | ICD-10-CM

## 2021-04-20 DIAGNOSIS — M79.645 FINGER PAIN, LEFT: ICD-10-CM

## 2021-04-20 DIAGNOSIS — M54.6 ACUTE BILATERAL THORACIC BACK PAIN: ICD-10-CM

## 2021-04-20 DIAGNOSIS — K21.9 GASTROESOPHAGEAL REFLUX DISEASE WITHOUT ESOPHAGITIS: ICD-10-CM

## 2021-04-20 DIAGNOSIS — S46.819A STRAIN OF TRAPEZIUS MUSCLE, UNSPECIFIED LATERALITY, INITIAL ENCOUNTER: ICD-10-CM

## 2021-04-20 DIAGNOSIS — M25.511 BILATERAL SHOULDER PAIN, UNSPECIFIED CHRONICITY: ICD-10-CM

## 2021-04-20 DIAGNOSIS — E03.9 HYPOTHYROIDISM, UNSPECIFIED TYPE: ICD-10-CM

## 2021-04-20 DIAGNOSIS — M25.512 BILATERAL SHOULDER PAIN, UNSPECIFIED CHRONICITY: ICD-10-CM

## 2021-04-20 LAB
ALBUMIN SERPL-MCNC: 4.1 G/DL (ref 3.5–5.2)
ALP BLD-CCNC: 81 U/L (ref 35–104)
ALT SERPL-CCNC: 50 U/L (ref 0–32)
ANION GAP SERPL CALCULATED.3IONS-SCNC: 12 MMOL/L (ref 7–16)
AST SERPL-CCNC: 32 U/L (ref 0–31)
BASOPHILS ABSOLUTE: 0.03 E9/L (ref 0–0.2)
BASOPHILS RELATIVE PERCENT: 0.5 % (ref 0–2)
BILIRUB SERPL-MCNC: 0.3 MG/DL (ref 0–1.2)
BUN BLDV-MCNC: 14 MG/DL (ref 6–20)
CALCIUM SERPL-MCNC: 9.2 MG/DL (ref 8.6–10.2)
CHLORIDE BLD-SCNC: 103 MMOL/L (ref 98–107)
CO2: 23 MMOL/L (ref 22–29)
CREAT SERPL-MCNC: 0.6 MG/DL (ref 0.5–1)
EOSINOPHILS ABSOLUTE: 0.15 E9/L (ref 0.05–0.5)
EOSINOPHILS RELATIVE PERCENT: 2.4 % (ref 0–6)
GFR AFRICAN AMERICAN: >60
GFR NON-AFRICAN AMERICAN: >60 ML/MIN/1.73
GLUCOSE BLD-MCNC: 113 MG/DL (ref 74–99)
HBA1C MFR BLD: 5.9 %
HCT VFR BLD CALC: 40.9 % (ref 34–48)
HEMOGLOBIN: 13.3 G/DL (ref 11.5–15.5)
IMMATURE GRANULOCYTES #: 0.02 E9/L
IMMATURE GRANULOCYTES %: 0.3 % (ref 0–5)
LYMPHOCYTES ABSOLUTE: 2.38 E9/L (ref 1.5–4)
LYMPHOCYTES RELATIVE PERCENT: 37.4 % (ref 20–42)
MCH RBC QN AUTO: 29.8 PG (ref 26–35)
MCHC RBC AUTO-ENTMCNC: 32.5 % (ref 32–34.5)
MCV RBC AUTO: 91.7 FL (ref 80–99.9)
MONOCYTES ABSOLUTE: 0.35 E9/L (ref 0.1–0.95)
MONOCYTES RELATIVE PERCENT: 5.5 % (ref 2–12)
NEUTROPHILS ABSOLUTE: 3.44 E9/L (ref 1.8–7.3)
NEUTROPHILS RELATIVE PERCENT: 53.9 % (ref 43–80)
PDW BLD-RTO: 12.6 FL (ref 11.5–15)
PLATELET # BLD: 276 E9/L (ref 130–450)
PMV BLD AUTO: 11 FL (ref 7–12)
POTASSIUM SERPL-SCNC: 3.8 MMOL/L (ref 3.5–5)
RBC # BLD: 4.46 E12/L (ref 3.5–5.5)
SODIUM BLD-SCNC: 138 MMOL/L (ref 132–146)
T4 FREE: 1.61 NG/DL (ref 0.93–1.7)
TOTAL PROTEIN: 7.4 G/DL (ref 6.4–8.3)
WBC # BLD: 6.4 E9/L (ref 4.5–11.5)

## 2021-04-20 PROCEDURE — 99214 OFFICE O/P EST MOD 30 MIN: CPT | Performed by: INTERNAL MEDICINE

## 2021-04-20 PROCEDURE — 83036 HEMOGLOBIN GLYCOSYLATED A1C: CPT | Performed by: INTERNAL MEDICINE

## 2021-04-20 PROCEDURE — 36415 COLL VENOUS BLD VENIPUNCTURE: CPT | Performed by: INTERNAL MEDICINE

## 2021-04-20 RX ORDER — NAPROXEN 500 MG/1
500 TABLET ORAL 2 TIMES DAILY WITH MEALS
Qty: 180 TABLET | Refills: 1 | Status: SHIPPED | OUTPATIENT
Start: 2021-04-20

## 2021-04-20 NOTE — PROGRESS NOTES
Patient information obtained with assistance from Ivan Rodriguez, , [facility]. Discharge instructions reviewed by Dr. Bria Junior with assistance from Ivan Rodriguez.     AVS given

## 2021-04-22 ENCOUNTER — HOSPITAL ENCOUNTER (OUTPATIENT)
Age: 41
Discharge: HOME | End: 2021-04-24

## 2021-04-22 ENCOUNTER — HOSPITAL ENCOUNTER (OUTPATIENT)
Dept: GENERAL RADIOLOGY | Age: 41
Discharge: HOME | End: 2021-04-24

## 2021-04-22 DIAGNOSIS — M25.512 BILATERAL SHOULDER PAIN, UNSPECIFIED CHRONICITY: ICD-10-CM

## 2021-04-22 DIAGNOSIS — M54.6 ACUTE BILATERAL THORACIC BACK PAIN: ICD-10-CM

## 2021-04-22 DIAGNOSIS — M25.511 BILATERAL SHOULDER PAIN, UNSPECIFIED CHRONICITY: ICD-10-CM

## 2021-04-22 PROCEDURE — 73030 X-RAY EXAM OF SHOULDER: CPT

## 2021-04-22 PROCEDURE — 72070 X-RAY EXAM THORAC SPINE 2VWS: CPT

## 2021-05-10 NOTE — PROGRESS NOTES
[facility] [place] Georgina Rios is a 41 y.o. female, presents to the mobile clinic with complaints of upper back pain. She is feeling much better. She is using Ibuprofen, not Naproxen.  for this visit:  Darleen Szymanski    Review of Systems   Constitutional: No fever, chills, fatigue, weight loss or gain, night sweats  Respiratory: No cough, dyspnea, wheezing, sputum, or hemoptysis  Cardiovascular: No chest pain, angina, dyspnea on exertion, orthopnea, PND   Gastrointestinal: No nausea, vomiting, diarrhea, abdominal pain, or blood per rectum  Genitourinary: No dysuria, nocturia, hesitancy, or incontinence  Musculoskeletal: No, numbness, or limited ROM. There is upper back pain at times  Neurological:  No headache, dizziness, numbness, or weakness    Objective:   Physical Exam   There were no vitals taken for this visit. General appearance: Alert, Awake, Oriented to Person, Place, and Time     Extremities: No edema, Peripheral pulses palpable in bilateral radial arteries and posterior tibial.   Musculoskeletal: Muscular strength appears intact. No joint effusion, tenderness, swelling or warmth. Skin: Warm and dry, no acute eczematous changes or pruritus. Neuro:  No focal motor or sensory deficits. CN II- XII intact. Assessment & Plan:   Diagnoses and all orders for this visit:    Acute bilateral thoracic back pain    Hypothyroidism, unspecified type    Degenerative disc disease, thoracic            Return to clinic in 3 months for  Labs including TSH.       Maurice Kahn M.D.,  5/10/2021

## 2021-05-11 ENCOUNTER — OFFICE VISIT (OUTPATIENT)
Dept: FAMILY MEDICINE CLINIC | Age: 41
End: 2021-05-11

## 2021-05-11 VITALS
RESPIRATION RATE: 20 BRPM | HEART RATE: 80 BPM | WEIGHT: 151.8 LBS | SYSTOLIC BLOOD PRESSURE: 124 MMHG | BODY MASS INDEX: 29.8 KG/M2 | DIASTOLIC BLOOD PRESSURE: 84 MMHG | HEIGHT: 60 IN | TEMPERATURE: 98.3 F | OXYGEN SATURATION: 98 %

## 2021-05-11 DIAGNOSIS — E03.9 HYPOTHYROIDISM, UNSPECIFIED TYPE: ICD-10-CM

## 2021-05-11 DIAGNOSIS — M51.34 DEGENERATIVE DISC DISEASE, THORACIC: ICD-10-CM

## 2021-05-11 DIAGNOSIS — M54.6 ACUTE BILATERAL THORACIC BACK PAIN: Primary | ICD-10-CM

## 2021-05-11 PROCEDURE — 99213 OFFICE O/P EST LOW 20 MIN: CPT | Performed by: INTERNAL MEDICINE

## 2021-05-11 RX ORDER — MELATONIN
1000 DAILY
Qty: 90 TABLET | Refills: 1 | Status: SHIPPED
Start: 2021-05-11 | End: 2022-08-23 | Stop reason: SDUPTHER

## 2021-05-11 RX ORDER — ANTACID TABLETS 648 MG/1
1 TABLET, CHEWABLE ORAL 2 TIMES DAILY
Qty: 180 TABLET | Refills: 1 | Status: SHIPPED
Start: 2021-05-11 | End: 2022-05-17 | Stop reason: SDUPTHER

## 2021-05-11 NOTE — PROGRESS NOTES
Patient information obtained with assistance from [facility], . Discharge instructions reviewed by Dr. Lia Driscoll with assistance from [facility].     AVS given

## 2021-07-14 ENCOUNTER — OFFICE VISIT (OUTPATIENT)
Dept: OBGYN | Age: 41
End: 2021-07-14

## 2021-07-14 VITALS
DIASTOLIC BLOOD PRESSURE: 72 MMHG | WEIGHT: 154 LBS | BODY MASS INDEX: 30.08 KG/M2 | HEART RATE: 72 BPM | SYSTOLIC BLOOD PRESSURE: 120 MMHG

## 2021-07-14 DIAGNOSIS — Z01.419 WOMEN'S ANNUAL ROUTINE GYNECOLOGICAL EXAMINATION: Primary | ICD-10-CM

## 2021-07-14 DIAGNOSIS — N93.9 ABNORMAL UTERINE BLEEDING: ICD-10-CM

## 2021-07-14 DIAGNOSIS — Z12.31 ENCOUNTER FOR SCREENING MAMMOGRAM FOR BREAST CANCER: ICD-10-CM

## 2021-07-14 PROCEDURE — 99213 OFFICE O/P EST LOW 20 MIN: CPT | Performed by: OBSTETRICS & GYNECOLOGY

## 2021-07-14 PROCEDURE — 99396 PREV VISIT EST AGE 40-64: CPT | Performed by: OBSTETRICS & GYNECOLOGY

## 2021-07-14 NOTE — PROGRESS NOTES
Patient alert and pleasant with no concerns  Here today for annual GYN exam  Pelvic exam, pap smear obtained, labeled  and hand delivered to lab. [address] [facility]  here to assist with this visit  Discharge instructions have been discussed with the patient. Patient advised to call our office with any questions or concerns. Voiced understanding.

## 2021-07-14 NOTE — PROGRESS NOTES
Valerio Veras, Italian interpretor, used for entire visit. Patient presents for annual exam. No complaints.      Past Medical History:   Diagnosis Date    Gestational diabetes 2015    Gestational diabetes 14    States Dr told her to check blood glucose once a week, diet controlled    Thyroid disease     Patient states she is taking 100 mcg of Synthroid prescribed through her doctor in [facility] 3/16/2021        Past Surgical History:   Procedure Laterality Date     SECTION  ,         Family History   Problem Relation Age of Onset    Diabetes Mother     Diabetes Brother     Diabetes Brother           Current Outpatient Medications:     vitamin D3 (CHOLECALCIFEROL) 25 MCG (1000 UT) TABS tablet, Take 1 tablet by mouth daily, Disp: 90 tablet, Rfl: 1    omeprazole (PRILOSEC) 40 MG delayed release capsule, Take 1 capsule by mouth as needed (Symptoms are stable), Disp: 90 capsule, Rfl: 1    levothyroxine (LEVOTHROID) 100 MCG tablet, Take 1 tablet by mouth daily, Disp: 90 tablet, Rfl: 1    calcium carbonate 648 MG TABS, Take 1 tablet by mouth 2 times daily (Patient not taking: Reported on 2021), Disp: 180 tablet, Rfl: 1    naproxen (NAPROSYN) 500 MG tablet, Take 1 tablet by mouth 2 times daily (with meals) (Patient not taking: Reported on 2021), Disp: 180 tablet, Rfl: 1    ibuprofen (ADVIL;MOTRIN) 200 MG tablet, Take 400 mg by mouth every 6 hours as needed for Pain (Patient not taking: Reported on 2021), Disp: , Rfl:      No Known Allergies     Social History     Tobacco History     Smoking Status  Never Smoker    Smokeless Tobacco Use  Never Used          Alcohol History     Alcohol Use Status  No          Drug Use     Drug Use Status  No          Sexual Activity     Sexually Active  Not Currently Partners  Male                 Vitals:    21 0957   BP: 120/72   Pulse: 72        Physical Exam:  General: pleasant,

## 2021-08-06 NOTE — PROGRESS NOTES
[facility] [place] Chantel Rivas is a 35 y.o. female, presents to the mobile clinic for follow-up of hypothyroidism and back pain. with complaints of \"pain in my uterus. \"  She has had this pain intermittently for years, and it occurs between her periods.  for this visit: Tracey Monroy    Review of Systems   Constitutional: No fever, chills, fatigue, weight loss or gain, night sweats  Respiratory: No cough, dyspnea, wheezing, sputum, or hemoptysis  Cardiovascular: No chest pain, angina, dyspnea on exertion, orthopnea, PND   Gastrointestinal: No nausea, vomiting, diarrhea, abdominal pain, or blood per rectum. There is pain in left pelvic area  Genitourinary: No dysuria, nocturia, hesitancy, or incontinence  Musculoskeletal: No pain, numbness, or limited ROM  Neurological:  No headache, dizziness, numbness, or weakness    Objective:   Physical Exam   /74 (Site: Left Upper Arm, Position: Sitting)   Pulse 89   Temp 98.3 °F (36.8 °C) (Oral)   Resp 20   Ht 5' (1.524 m)   Wt 154 lb 12.8 oz (70.2 kg)   LMP 06/08/2021 (Approximate) Comment: pt states she had some spotting last month  SpO2 98%   BMI 30.23 kg/m²   General appearance: Alert, Awake, Oriented to Person, Place, and Time   Head: Normocephalic. No masses, lesions or tenderness noted. Neck: Neck supple. No jugular venous distension, lymphadenopathy or thyromegaly. Trachea midline  Lungs: Lungs clear to auscultation bilaterally. No rhonchi, crackles or wheezes  Heart:  Regular rate and rhythm with auscultation of S1, S2. No rub, murmur or gallop  Abdomen: Soft, non-tender abdomen with bowel sounds in four quadrants. No hepatosplenomegaly or masses. No pelvic tenderness   Extremities: No edema, Peripheral pulses palpable in bilateral radial arteries and posterior tibial.   Musculoskeletal: Muscular strength appears intact. No joint effusion, tenderness, swelling or warmth.   Skin: Warm and dry, no acute eczematous changes or pruritus. Assessment & Plan:   Claudy Thomas was seen today for pain.     Diagnoses and all orders for this visit:    Hypothyroidism, unspecified type    Pelvic pain            Return to clinic in 2 months    Sebastian Elder MD, M.D.,  8/10/2021

## 2021-08-10 ENCOUNTER — OFFICE VISIT (OUTPATIENT)
Dept: FAMILY MEDICINE CLINIC | Age: 41
End: 2021-08-10

## 2021-08-10 VITALS
WEIGHT: 154.8 LBS | HEIGHT: 60 IN | BODY MASS INDEX: 30.39 KG/M2 | DIASTOLIC BLOOD PRESSURE: 74 MMHG | SYSTOLIC BLOOD PRESSURE: 120 MMHG | TEMPERATURE: 98.3 F | OXYGEN SATURATION: 98 % | HEART RATE: 89 BPM | RESPIRATION RATE: 20 BRPM

## 2021-08-10 DIAGNOSIS — R10.2 PELVIC PAIN: ICD-10-CM

## 2021-08-10 DIAGNOSIS — E03.9 HYPOTHYROIDISM, UNSPECIFIED TYPE: Primary | ICD-10-CM

## 2021-08-10 PROCEDURE — 99213 OFFICE O/P EST LOW 20 MIN: CPT | Performed by: INTERNAL MEDICINE

## 2021-08-10 NOTE — PROGRESS NOTES
Patient information obtained with assistance from [facility], . Discharge instructions reviewed by Dr. Shaheen Morales with assistance from [place].     AVS given

## 2021-08-26 ENCOUNTER — TELEPHONE (OUTPATIENT)
Dept: INTERNAL MEDICINE | Age: 41
End: 2021-08-26

## 2021-10-18 NOTE — PROGRESS NOTES
[facility] Medicine [place] Rosita Mora is a 35 y.o. female, presents to the mobile clinic with a history of hypothyroidism and a HgbA1C of 5.9. Complains of pain   She had Fallopian tubes removed.  for this visit: Sproul Lieu    Review of Systems   Constitutional: No fever, chills, fatigue, weight loss or gain, night sweats  Respiratory: No cough, dyspnea, wheezing, sputum, or hemoptysis  Cardiovascular: No chest pain, angina, dyspnea on exertion, orthopnea, PND   Gastrointestinal: No nausea, vomiting, diarrhea, abdominal pain, or blood per rectum  Genitourinary: No dysuria, nocturia, hesitancy, or incontinence  Musculoskeletal: Pain anterior chest wall when lifting heavy objects  Neurological:  No headache, dizziness, numbness, or weakness    Objective:   Physical Exam   /78 (Site: Left Upper Arm, Position: Sitting)   Pulse 98   Temp 97.8 °F (36.6 °C) (Oral)   Ht 5' (1.524 m)   Wt 154 lb (69.9 kg)   LMP 08/17/2021 (Approximate)   SpO2 98%   BMI 30.08 kg/m²   General appearance: Alert, Awake, Oriented to Person, Place, and Time   Head: Normocephalic. No masses, lesions or tenderness noted. Neck: Neck supple. No jugular venous distension, lymphadenopathy or thyromegaly. Trachea midline  Lungs: Lungs clear to auscultation bilaterally. No rhonchi, crackles or wheezes  Heart:  Regular rate and rhythm with auscultation of S1, S2. No rub, murmur or gallop  Abdomen: Soft, non-tender abdomen with bowel sounds in four quadrants. No hepatosplenomegaly or masses. Extremities: No edema, Peripheral pulses palpable in bilateral radial arteries and posterior tibial.   Musculoskeletal: Muscular strength appears intact. No joint effusion, tenderness, swelling or warmth. No chest wall tenderness  Skin: Warm and dry, no acute eczematous changes or pruritus. Neuro:  No focal motor or sensory deficits. CN II- XII intact.      Assessment & Plan:   Pedrito Presley was seen today for follow-up. Diagnoses and all orders for this visit:    Hypothyroidism, unspecified type  -     TSH without Reflex; Future    Screening for diabetes mellitus  -     POCT glycosylated hemoglobin (Hb A1C)    Gastroesophageal reflux disease without esophagitis  -     omeprazole (PRILOSEC) 40 MG delayed release capsule; Take 1 capsule by mouth as needed (Symptoms are stable)    Status post surgical removal of both fallopian tubes    Muscle strain of anterior chest wall    Other orders  -     ibuprofen (ADVIL;MOTRIN) 200 MG tablet;  Take 2 tablets by mouth every 6 hours as needed for Pain  -     INFLUENZA, QUADV, 3 YRS AND OLDER, IM PF, PREFILL SYR OR SDV, 0.5ML (Kelly Germain, PF)            Return to clinic in     Lisbet Cabrera MD, M.GREGG.,  10/19/2021

## 2021-10-19 ENCOUNTER — OFFICE VISIT (OUTPATIENT)
Dept: FAMILY MEDICINE CLINIC | Age: 41
End: 2021-10-19

## 2021-10-19 VITALS
HEART RATE: 98 BPM | WEIGHT: 154 LBS | HEIGHT: 60 IN | OXYGEN SATURATION: 98 % | TEMPERATURE: 97.8 F | DIASTOLIC BLOOD PRESSURE: 78 MMHG | BODY MASS INDEX: 30.23 KG/M2 | SYSTOLIC BLOOD PRESSURE: 120 MMHG

## 2021-10-19 DIAGNOSIS — Z13.1 SCREENING FOR DIABETES MELLITUS: ICD-10-CM

## 2021-10-19 DIAGNOSIS — E03.9 HYPOTHYROIDISM, UNSPECIFIED TYPE: ICD-10-CM

## 2021-10-19 DIAGNOSIS — Z90.79: ICD-10-CM

## 2021-10-19 DIAGNOSIS — K21.9 GASTROESOPHAGEAL REFLUX DISEASE WITHOUT ESOPHAGITIS: ICD-10-CM

## 2021-10-19 DIAGNOSIS — S29.011A MUSCLE STRAIN OF ANTERIOR CHEST WALL: ICD-10-CM

## 2021-10-19 DIAGNOSIS — E03.9 HYPOTHYROIDISM, UNSPECIFIED TYPE: Primary | ICD-10-CM

## 2021-10-19 LAB
HBA1C MFR BLD: 5.7 %
TSH SERPL DL<=0.05 MIU/L-ACNC: 0.99 UIU/ML (ref 0.27–4.2)

## 2021-10-19 PROCEDURE — 90471 IMMUNIZATION ADMIN: CPT | Performed by: INTERNAL MEDICINE

## 2021-10-19 PROCEDURE — 36415 COLL VENOUS BLD VENIPUNCTURE: CPT | Performed by: INTERNAL MEDICINE

## 2021-10-19 PROCEDURE — 90686 IIV4 VACC NO PRSV 0.5 ML IM: CPT | Performed by: INTERNAL MEDICINE

## 2021-10-19 PROCEDURE — 99213 OFFICE O/P EST LOW 20 MIN: CPT | Performed by: INTERNAL MEDICINE

## 2021-10-19 PROCEDURE — 83036 HEMOGLOBIN GLYCOSYLATED A1C: CPT | Performed by: INTERNAL MEDICINE

## 2021-10-19 RX ORDER — OMEPRAZOLE 40 MG/1
40 CAPSULE, DELAYED RELEASE ORAL PRN
Qty: 90 CAPSULE | Refills: 1 | Status: SHIPPED | OUTPATIENT
Start: 2021-10-19

## 2021-10-19 RX ORDER — IBUPROFEN 200 MG
400 TABLET ORAL EVERY 6 HOURS PRN
Qty: 120 TABLET | Refills: 1 | Status: SHIPPED | COMMUNITY
Start: 2021-10-19 | End: 2021-10-19 | Stop reason: SDUPTHER

## 2021-10-19 RX ORDER — IBUPROFEN 200 MG
400 TABLET ORAL EVERY 6 HOURS PRN
Qty: 120 TABLET | Refills: 1 | Status: SHIPPED
Start: 2021-10-19 | End: 2022-05-17 | Stop reason: DRUGHIGH

## 2021-11-30 ENCOUNTER — OFFICE VISIT (OUTPATIENT)
Dept: FAMILY MEDICINE CLINIC | Age: 41
End: 2021-11-30

## 2021-11-30 VITALS
DIASTOLIC BLOOD PRESSURE: 84 MMHG | TEMPERATURE: 98.6 F | HEART RATE: 100 BPM | HEIGHT: 60 IN | OXYGEN SATURATION: 97 % | WEIGHT: 154 LBS | SYSTOLIC BLOOD PRESSURE: 128 MMHG | BODY MASS INDEX: 30.23 KG/M2

## 2021-11-30 DIAGNOSIS — N63.10 BREAST MASS, RIGHT: Primary | ICD-10-CM

## 2021-11-30 DIAGNOSIS — N91.2 AMENORRHEA: ICD-10-CM

## 2021-11-30 DIAGNOSIS — N64.4 BREAST TENDERNESS IN FEMALE: ICD-10-CM

## 2021-11-30 DIAGNOSIS — R07.89 CHEST WALL PAIN: ICD-10-CM

## 2021-11-30 LAB
CONTROL: NORMAL
PREGNANCY TEST URINE, POC: NEGATIVE

## 2021-11-30 PROCEDURE — 99214 OFFICE O/P EST MOD 30 MIN: CPT | Performed by: INTERNAL MEDICINE

## 2021-11-30 PROCEDURE — 81025 URINE PREGNANCY TEST: CPT | Performed by: INTERNAL MEDICINE

## 2021-11-30 NOTE — PROGRESS NOTES
Patient information obtained with assistance from [facility], . Discharge instructions reviewed by Dr. vEonne Winters with assistance from [place].     AVS given

## 2021-11-30 NOTE — PROGRESS NOTES
Internal Medicine [place] Morgan Vazquez is a 35 y.o. female, presents to the mobile clinic with complaints of right brest burning at nipple area with redness. She had a mammogram 07/14/2021. Also has CP which occurs when lifting her child/heavy items.  for this visit: Abisai Kuhn    Review of Systems   Constitutional: No fever, chills, fatigue, weight loss or gain, night sweats  Respiratory: No cough, dyspnea, wheezing, sputum, or hemoptysis  Cardiovascular: No chest pain, angina, dyspnea on exertion, orthopnea, PND   Gastrointestinal: No nausea, vomiting, diarrhea, abdominal pain, or blood per rectum  Genitourinary: No dysuria, nocturia, hesitancy, or incontinence  Musculoskeletal: No pain, numbness, or limited ROM  Neurological:  No headache, dizziness, numbness, or weakness    Objective:   Physical Exam   /84 (Site: Left Upper Arm, Position: Sitting)   Pulse 100   Temp 98.6 °F (37 °C) (Infrared)   Ht 5' (1.524 m)   Wt 154 lb (69.9 kg)   LMP 09/14/2021 (Approximate)   SpO2 97%   BMI 30.08 kg/m²   General appearance: Alert, Awake, Oriented to Person, Place, and Time   Head: Normocephalic. No masses, lesions or tenderness noted. Neck: Neck supple. No jugular venous distension, lymphadenopathy or thyromegaly. Trachea midline  Lungs: Lungs clear to auscultation bilaterally. No rhonchi, crackles or wheezes  Heart:  Regular rate and rhythm with auscultation of S1, S2. No rub, murmur or gallop  Abdomen: Soft, non-tender abdomen with bowel sounds in four quadrants. No hepatosplenomegaly or masses. Extremities: No edema, Peripheral pulses palpable in bilateral radial arteries and posterior tibial.   Musculoskeletal: Muscular strength appears intact. No joint effusion, tenderness, swelling or warmth. Skin: Warm and dry, no acute eczematous changes or pruritus. Neuro:  No focal motor or sensory deficits. CN II- XII intact.    BREAST:  Mass left lateral at 9 o'clock, with tenderness. No axillary nodules  Assessment & Plan:   Donovan Alicea was seen today for chest pain and breast problem. Diagnoses and all orders for this visit:    Breast mass, right  -     LAURA DIGITAL DIAGNOSTIC BILATERAL PER PROTOCOL; Future  -     US BREAST COMPLETE RIGHT; Future    Breast tenderness in female  -     LAURA DIGITAL DIAGNOSTIC BILATERAL PER PROTOCOL; Future  -     US BREAST COMPLETE RIGHT;  Future            Return to clinic in 3 weeks    Lonnie Lai MD, M.D.,  11/30/2021

## 2021-12-17 ENCOUNTER — HOSPITAL ENCOUNTER (OUTPATIENT)
Dept: GENERAL RADIOLOGY | Age: 41
Discharge: HOME | End: 2021-12-19

## 2021-12-17 DIAGNOSIS — N63.10 BREAST MASS, RIGHT: ICD-10-CM

## 2021-12-17 DIAGNOSIS — N64.4 BREAST TENDERNESS IN FEMALE: ICD-10-CM

## 2021-12-17 PROCEDURE — 76642 ULTRASOUND BREAST LIMITED: CPT

## 2021-12-17 PROCEDURE — G0279 TOMOSYNTHESIS, MAMMO: HCPCS

## 2021-12-18 NOTE — RESULT ENCOUNTER NOTE
Diagnostic mammogram and ultrasound right breast are negative.   Will discuss with patient Tuesday AM 12/21/2021

## 2021-12-18 NOTE — PROGRESS NOTES
Internal Medicine [place] Karl Conti is a 35 y.o. female, presents to the mobile clinic for follow up of right   breast tenderness and redness in nipple area. A bilateral diagnostic mammogram was done as well as an ultrasound right breast. Both of these studies were negative. She has no breast pain or burning at this time.  for this visit: Tracey Monroy    Review of Systems   Constitutional: No fever, chills, fatigue, weight loss or gain, night sweats  Respiratory: No cough, dyspnea, wheezing, sputum, or hemoptysis  Cardiovascular: No chest pain, angina, dyspnea on exertion, orthopnea, PND   Gastrointestinal: No nausea, vomiting, diarrhea, abdominal pain, or blood per rectum  Genitourinary: No dysuria, nocturia, hesitancy, or incontinence  Musculoskeletal: No pain, numbness, or limited ROM  Neurological:  No headache, dizziness, numbness, or weakness    Objective:   Physical Exam   There were no vitals taken for this visit. General appearance: Alert, Awake, Oriented to Person, Place, and Time   Head: Normocephalic. No masses, lesions or tenderness noted. Eyes: Conjunctivae appear normal, EOMI, PERRL. No scleral icterus or drainage. Ears: External ears normal, no otalgia or erythema. Nose/Sinuses: Nares normal. Septum midline. Mucosa normal. No drainage  Oropharynx: Oropharynx clear with no exudate seen  Neck: Neck supple. No jugular venous distension, lymphadenopathy or thyromegaly. Trachea midline  Lungs: Lungs clear to auscultation bilaterally. No rhonchi, crackles or wheezes  Heart:  Regular rate and rhythm with auscultation of S1, S2.  No rub, murmur or gallop    Extremities: No edema, Peripheral pulses palpable in bilateral radial arteries and posterior tibial.        Assessment & Plan:   Diagnoses and all orders for this visit:    Breast tenderness in female      Diagnostic mammogram and ultrasound reports are Normal.    Return for appointment in 1 month to check labs including HgbA1C and TSH.       Return to clinic in     Alis Aj MD, M.D.,  12/18/2021

## 2021-12-21 ENCOUNTER — OFFICE VISIT (OUTPATIENT)
Dept: FAMILY MEDICINE CLINIC | Age: 41
End: 2021-12-21

## 2021-12-21 VITALS
HEIGHT: 60 IN | WEIGHT: 149.8 LBS | TEMPERATURE: 97.8 F | OXYGEN SATURATION: 97 % | HEART RATE: 99 BPM | BODY MASS INDEX: 29.41 KG/M2 | SYSTOLIC BLOOD PRESSURE: 130 MMHG | DIASTOLIC BLOOD PRESSURE: 86 MMHG

## 2021-12-21 DIAGNOSIS — Z13.1 SCREENING FOR DIABETES MELLITUS: ICD-10-CM

## 2021-12-21 DIAGNOSIS — N64.4 BREAST TENDERNESS IN FEMALE: Primary | ICD-10-CM

## 2021-12-21 LAB
CHP ED QC CHECK: NORMAL
GLUCOSE BLD-MCNC: 103 MG/DL

## 2021-12-21 PROCEDURE — 99212 OFFICE O/P EST SF 10 MIN: CPT | Performed by: INTERNAL MEDICINE

## 2021-12-21 PROCEDURE — 82962 GLUCOSE BLOOD TEST: CPT | Performed by: INTERNAL MEDICINE

## 2021-12-21 NOTE — PROGRESS NOTES
Patient information obtained with assistance from [facility], . Discharge instructions reviewed by Dr. Jamil Darby and myself with assistance from [facility] SERVICES.     AVS given

## 2022-01-23 PROBLEM — E03.9 ACQUIRED HYPOTHYROIDISM: Status: ACTIVE | Noted: 2022-01-23

## 2022-02-03 NOTE — PROGRESS NOTES
[facility] Medicine [place] Eli Tanner is a 35 y.o. female, presents to the mobile clinic for follow-up of Htpothyroidism. She feels well. Both of her parents had DM2, and she is worried that she may have it as well.  for this visit: Tripp Huang    Review of Systems   Constitutional: No fever, chills, fatigue, weight loss or gain, night sweats  Respiratory: No cough, dyspnea, wheezing, sputum, or hemoptysis  Cardiovascular: No chest pain, angina, dyspnea on exertion, orthopnea, PND   Gastrointestinal: No nausea, vomiting, diarrhea, abdominal pain, or blood per rectum  Genitourinary: No dysuria, nocturia, hesitancy, or incontinence  Musculoskeletal: No pain, numbness, or limited ROM  Neurological:  No headache, dizziness, numbness, or weakness    Objective:   Physical Exam   /82 (Site: Left Upper Arm, Position: Sitting)   Pulse 88   Temp 98 °F (36.7 °C) (Infrared)   Ht 5' (1.524 m)   Wt 150 lb 3.2 oz (68.1 kg)   LMP 01/10/2022 (Approximate)   SpO2 98%   BMI 29.33 kg/m²   General appearance: Alert, Awake, Oriented to Person, Place, and Time   Head: Normocephalic. No masses, lesions or tenderness noted. Eyes: Conjunctivae appear normal, EOMI, PERRL. No scleral icterus or drainage. Ears: External ears normal, no otalgia or erythema. Nose/Sinuses: Nares normal. Septum midline. Mucosa normal. No drainage  Oropharynx: Oropharynx clear with no exudate seen  Neck: Neck supple. No jugular venous distension, lymphadenopathy or thyromegaly. Trachea midline  Lungs: Lungs clear to auscultation bilaterally. No rhonchi, crackles or wheezes  Heart:  Regular rate and rhythm with auscultation of S1, S2. No rub, murmur or gallop  Abdomen: Soft, non-tender abdomen with bowel sounds in four quadrants. No hepatosplenomegaly or masses. Extremities: No edema, Peripheral pulses palpable in bilateral radial arteries and posterior tibial.   Musculoskeletal: Muscular strength appears intact. No joint effusion, tenderness, swelling or warmth. Skin: Warm and dry, no acute eczematous changes or pruritus. Neuro:  No focal motor or sensory deficits. CN II- XII intact. Assessment & Plan:   Shipman Officer was seen today for follow-up. Diagnoses and all orders for this visit:    Hypothyroidism, unspecified type    Screening for diabetes mellitus    Screening for hypercholesterolemia    Prediabetes      Discussed prediabetes in detail.       Return to clinic in 3 months    Gulshan Dominique MD  2/8/2022

## 2022-02-08 ENCOUNTER — OFFICE VISIT (OUTPATIENT)
Dept: FAMILY MEDICINE CLINIC | Age: 42
End: 2022-02-08

## 2022-02-08 VITALS
HEART RATE: 88 BPM | WEIGHT: 150.2 LBS | SYSTOLIC BLOOD PRESSURE: 118 MMHG | HEIGHT: 60 IN | DIASTOLIC BLOOD PRESSURE: 82 MMHG | OXYGEN SATURATION: 98 % | BODY MASS INDEX: 29.49 KG/M2 | TEMPERATURE: 98 F

## 2022-02-08 DIAGNOSIS — Z13.220 SCREENING FOR HYPERCHOLESTEROLEMIA: ICD-10-CM

## 2022-02-08 DIAGNOSIS — Z13.1 SCREENING FOR DIABETES MELLITUS: ICD-10-CM

## 2022-02-08 DIAGNOSIS — E03.9 HYPOTHYROIDISM, UNSPECIFIED TYPE: ICD-10-CM

## 2022-02-08 DIAGNOSIS — R73.03 PREDIABETES: ICD-10-CM

## 2022-02-08 DIAGNOSIS — E03.9 HYPOTHYROIDISM, UNSPECIFIED TYPE: Primary | ICD-10-CM

## 2022-02-08 LAB
ALBUMIN SERPL-MCNC: 4.6 G/DL (ref 3.5–5.2)
ALP BLD-CCNC: 83 U/L (ref 35–104)
ALT SERPL-CCNC: 34 U/L (ref 0–32)
ANION GAP SERPL CALCULATED.3IONS-SCNC: 20 MMOL/L (ref 7–16)
AST SERPL-CCNC: 24 U/L (ref 0–31)
BASOPHILS ABSOLUTE: 0.03 E9/L (ref 0–0.2)
BASOPHILS RELATIVE PERCENT: 0.4 % (ref 0–2)
BILIRUB SERPL-MCNC: 0.3 MG/DL (ref 0–1.2)
BUN BLDV-MCNC: 12 MG/DL (ref 6–20)
CALCIUM SERPL-MCNC: 10.2 MG/DL (ref 8.6–10.2)
CHLORIDE BLD-SCNC: 102 MMOL/L (ref 98–107)
CHOLESTEROL, TOTAL: 230 MG/DL (ref 0–199)
CHP ED QC CHECK: ABNORMAL
CO2: 19 MMOL/L (ref 22–29)
CREAT SERPL-MCNC: 0.6 MG/DL (ref 0.5–1)
EOSINOPHILS ABSOLUTE: 0.11 E9/L (ref 0.05–0.5)
EOSINOPHILS RELATIVE PERCENT: 1.3 % (ref 0–6)
GFR AFRICAN AMERICAN: >60
GFR NON-AFRICAN AMERICAN: >60 ML/MIN/1.73
GLUCOSE BLD-MCNC: 109 MG/DL
GLUCOSE BLD-MCNC: 75 MG/DL (ref 74–99)
HBA1C MFR BLD: 6 %
HCT VFR BLD CALC: 39.7 % (ref 34–48)
HDLC SERPL-MCNC: 42 MG/DL
HEMOGLOBIN: 13.4 G/DL (ref 11.5–15.5)
IMMATURE GRANULOCYTES #: 0.02 E9/L
IMMATURE GRANULOCYTES %: 0.2 % (ref 0–5)
LDL CHOLESTEROL CALCULATED: 131 MG/DL (ref 0–99)
LYMPHOCYTES ABSOLUTE: 3.05 E9/L (ref 1.5–4)
LYMPHOCYTES RELATIVE PERCENT: 36.4 % (ref 20–42)
MCH RBC QN AUTO: 30.2 PG (ref 26–35)
MCHC RBC AUTO-ENTMCNC: 33.8 % (ref 32–34.5)
MCV RBC AUTO: 89.4 FL (ref 80–99.9)
MONOCYTES ABSOLUTE: 0.38 E9/L (ref 0.1–0.95)
MONOCYTES RELATIVE PERCENT: 4.5 % (ref 2–12)
NEUTROPHILS ABSOLUTE: 4.79 E9/L (ref 1.8–7.3)
NEUTROPHILS RELATIVE PERCENT: 57.2 % (ref 43–80)
PDW BLD-RTO: 12 FL (ref 11.5–15)
PLATELET # BLD: 274 E9/L (ref 130–450)
PMV BLD AUTO: 10.8 FL (ref 7–12)
POTASSIUM SERPL-SCNC: 4 MMOL/L (ref 3.5–5)
RBC # BLD: 4.44 E12/L (ref 3.5–5.5)
SODIUM BLD-SCNC: 141 MMOL/L (ref 132–146)
TOTAL PROTEIN: 8.2 G/DL (ref 6.4–8.3)
TRIGL SERPL-MCNC: 283 MG/DL (ref 0–149)
TSH SERPL DL<=0.05 MIU/L-ACNC: 0.95 UIU/ML (ref 0.27–4.2)
VLDLC SERPL CALC-MCNC: 57 MG/DL
WBC # BLD: 8.4 E9/L (ref 4.5–11.5)

## 2022-02-08 PROCEDURE — 82962 GLUCOSE BLOOD TEST: CPT | Performed by: INTERNAL MEDICINE

## 2022-02-08 PROCEDURE — 83036 HEMOGLOBIN GLYCOSYLATED A1C: CPT | Performed by: INTERNAL MEDICINE

## 2022-02-08 PROCEDURE — 99214 OFFICE O/P EST MOD 30 MIN: CPT | Performed by: INTERNAL MEDICINE

## 2022-02-08 PROCEDURE — 36415 COLL VENOUS BLD VENIPUNCTURE: CPT | Performed by: INTERNAL MEDICINE

## 2022-02-08 NOTE — PROGRESS NOTES
Patient information obtained with assistance from [facility], . Discharge instructions reviewed by Dr. Daniel Alex and myself with assistance from [facility] SERVICES.     AVS given

## 2022-03-17 ENCOUNTER — OFFICE VISIT (OUTPATIENT)
Dept: FAMILY MEDICINE CLINIC | Age: 42
End: 2022-03-17
Payer: COMMERCIAL

## 2022-03-17 VITALS
OXYGEN SATURATION: 96 % | TEMPERATURE: 97.8 F | RESPIRATION RATE: 18 BRPM | BODY MASS INDEX: 29.45 KG/M2 | HEART RATE: 101 BPM | SYSTOLIC BLOOD PRESSURE: 126 MMHG | DIASTOLIC BLOOD PRESSURE: 80 MMHG | HEIGHT: 60 IN | WEIGHT: 150 LBS

## 2022-03-17 DIAGNOSIS — J01.40 ACUTE NON-RECURRENT PANSINUSITIS: Primary | ICD-10-CM

## 2022-03-17 PROCEDURE — 1036F TOBACCO NON-USER: CPT | Performed by: PHYSICIAN ASSISTANT

## 2022-03-17 PROCEDURE — 99213 OFFICE O/P EST LOW 20 MIN: CPT | Performed by: PHYSICIAN ASSISTANT

## 2022-03-17 PROCEDURE — G8417 CALC BMI ABV UP PARAM F/U: HCPCS | Performed by: PHYSICIAN ASSISTANT

## 2022-03-17 PROCEDURE — G8427 DOCREV CUR MEDS BY ELIG CLIN: HCPCS | Performed by: PHYSICIAN ASSISTANT

## 2022-03-17 PROCEDURE — G8482 FLU IMMUNIZE ORDER/ADMIN: HCPCS | Performed by: PHYSICIAN ASSISTANT

## 2022-03-17 RX ORDER — METHYLPREDNISOLONE 4 MG/1
TABLET ORAL
Qty: 1 KIT | Refills: 0 | Status: SHIPPED | OUTPATIENT
Start: 2022-03-17 | End: 2022-03-23

## 2022-03-17 RX ORDER — AMOXICILLIN AND CLAVULANATE POTASSIUM 875; 125 MG/1; MG/1
1 TABLET, FILM COATED ORAL 2 TIMES DAILY
Qty: 20 TABLET | Refills: 0 | Status: SHIPPED | OUTPATIENT
Start: 2022-03-17 | End: 2022-03-27

## 2022-03-17 NOTE — LETTER
[facility]  [address] [place] [address]  Phone: [phone]  Fax: [phone]    Drew Alonzo. Lynne Kong        March 17, 2022     Patient: Daniel Alvarez   YOB: 1980   Date of Visit: 3/17/2022       To Whom It May Concern: It is my medical opinion that Daniel Alvarez should remain out of work until 3/21. Please excuse her from missing work from 3/18-3/20. If you have any questions or concerns, please don't hesitate to call. Sincerely,        Drew Alonzo.  LAURA Richard

## 2022-03-17 NOTE — PROGRESS NOTES
Chief Complaint       Sinus Problem (x 1 week, ibuprofen at home)      History of Present Illness   Source of history provided by:  patient. Qian Ellis is a 35 y.o. old female presenting to the walk in clinic for evaluation of sinus pressure, nasal congestion, discolored nasal drainage, and sore throat x 1 week. Has been taking ibuprofen OTC without relief. Denies any fever, chills, wheezing, CP, SOB, or GI symptoms. Denies any hx of asthma, COPD, or tobacco use. Denies any contact with any individuals with known COVID-19 infection or under investigation for COVID-19 infection. Pt has been vaccinated for COVID-19. She also reports she tested negative for COVID-19 at work today. ROS    Unless otherwise stated in this report or unable to obtain because of the patient's clinical or mental status as evidenced by the medical record, this patients's positive and negative responses for Review of Systems, constitutional, psych, eyes, ENT, cardiovascular, respiratory, gastrointestinal, neurological, genitourinary, musculoskeletal, integument systems and systems related to the presenting problem are either stated in the preceding or were not pertinent or were negative for the symptoms and/or complaints related to the medical problem. Past Medical History:  has a past medical history of Gestational diabetes, Gestational diabetes, Thyroid disease, and Trapezius strain. Past Surgical History:  has a past surgical history that includes  section (, 2016). Social History:  reports that she has never smoked. She has never used smokeless tobacco. She reports that she does not drink alcohol and does not use drugs. Family History: family history includes Diabetes in her brother, brother, and mother. Allergies: Patient has no known allergies.     Physical Exam         VS:  /80   Pulse 101   Temp 97.8 °F (36.6 °C) (Temporal)   Resp 18   Ht 5' (1.524 m)   Wt 150 lb (68 kg)   SpO2 96%   BMI 29.29 kg/m²    Oxygen Saturation Interpretation: Normal.    Constitutional:  Alert, development consistent with age. Head: Moderate TTP over the bilateral frontal, ethmoid, and maxillary sinuses. Nose:   Moderate congestion of the nasal mucosa. There is mild injection to middle turbinates bilaterally. Throat:  No posterior pharyngeal erythema with mild post nasal drip present. No exudate or tonsillar hypertrophy noted. Neck:  Supple. There is no anterior cervical adenopathy. Lungs: CTAB without wheezes, rales, or rhonchi  Heart:  Regular rate and rhythm, normal heart sounds, without pathological murmurs, ectopy, gallops, or rubs. Skin:  Normal turgor. Warm, dry, without visible rash. Neurological:  Alert and oriented. Motor functions intact. Responds to verbal commands. Lab / Imaging Results   (All laboratory and radiology results have been personally reviewed by myself)  Labs:  No results found for this visit on 03/17/22. Imaging: All Radiology results interpreted by Radiologist unless otherwise noted. Assessment / Plan     Impression(s):  Eun Nava was seen today for sinus problem. Diagnoses and all orders for this visit:    Acute non-recurrent pansinusitis  -     methylPREDNISolone (MEDROL DOSEPACK) 4 MG tablet; Take by mouth. -     amoxicillin-clavulanate (AUGMENTIN) 875-125 MG per tablet; Take 1 tablet by mouth 2 times daily for 10 days      Disposition:  Disposition: Discharge to home. Scripts written for Augmentin and a Medrol Dosepak, side effects discussed. Increase fluids and rest. Symptomatic relief discussed. F/u PCP in 5-7 days if symptoms persist. ED sooner if symptoms worsen or change. Red flag symptoms discussed. Pt is in agreement with this care plan. All questions answered. Winnie Richard PA-C    **This report was transcribed using voice recognition software.  Every effort was made to ensure accuracy; however, inadvertent computerized transcription errors may be present.

## 2022-05-13 NOTE — PROGRESS NOTES
[facility] Nahomi Pedraza is a 35 y.o. female, presents to the mobile clinic with history of hypothyroidism. She is feeling much better.  for this visit: Checo Caballero    Review of Systems   Constitutional: No fever, chills, fatigue, weight loss or gain, night sweats  Respiratory: No cough, dyspnea, wheezing, sputum, or hemoptysis  Cardiovascular: No chest pain, angina, dyspnea on exertion, orthopnea, PND   Gastrointestinal: No nausea, vomiting, diarrhea, abdominal pain, or blood per rectum  Genitourinary: No dysuria, nocturia, hesitancy, or incontinence  Musculoskeletal: No pain, numbness, or limited ROM  Neurological:  No headache, dizziness, numbness, or weakness    Objective:   Physical Exam   /74   Pulse 97   Temp 97.5 °F (36.4 °C) (Temporal)   Resp 18   Ht 5' (1.524 m)   Wt 150 lb (68 kg)   SpO2 97%   BMI 29.29 kg/m²   General appearance: Alert, Awake, Oriented to Person, Place, and Time   Head: Normocephalic. No masses, lesions or tenderness noted. Neck: Neck supple. No jugular venous distension, lymphadenopathy or thyromegaly. Trachea midline  Lungs: Lungs clear to auscultation bilaterally. No rhonchi, crackles or wheezes  Heart:  Regular rate and rhythm with auscultation of S1, S2. No rub, murmur or gallop  Abdomen: Soft, non-tender abdomen with bowel sounds in four quadrants. No hepatosplenomegaly or masses. Extremities: No edema, Peripheral pulses palpable in bilateral radial arteries and posterior tibial.   .  Skin: Warm and dry, no acute eczematous changes or pruritus. Assessment & Plan:   Dayana Sheriff was seen today for follow-up. Diagnoses and all orders for this visit:    Elevated LFTs  -     Hepatitis B Surface Antigen; Future  -     Hepatitis C Antibody;  Future    Hypothyroidism, unspecified type    Other orders  -     calcium carbonate 648 MG TABS; Take 1 tablet by mouth 2 times daily        Continue the same dose of synthroid.   Recheck TSH, and A1C I 3 months    Return to clinic in     Jaycob Packer MD, M.D.,  5/17/2022

## 2022-05-17 ENCOUNTER — OFFICE VISIT (OUTPATIENT)
Dept: FAMILY MEDICINE CLINIC | Age: 42
End: 2022-05-17
Payer: COMMERCIAL

## 2022-05-17 VITALS
SYSTOLIC BLOOD PRESSURE: 112 MMHG | HEART RATE: 97 BPM | OXYGEN SATURATION: 97 % | DIASTOLIC BLOOD PRESSURE: 74 MMHG | HEIGHT: 60 IN | WEIGHT: 150 LBS | RESPIRATION RATE: 18 BRPM | TEMPERATURE: 97.5 F | BODY MASS INDEX: 29.45 KG/M2

## 2022-05-17 DIAGNOSIS — R79.89 ELEVATED LFTS: Primary | ICD-10-CM

## 2022-05-17 DIAGNOSIS — E03.9 HYPOTHYROIDISM, UNSPECIFIED TYPE: ICD-10-CM

## 2022-05-17 DIAGNOSIS — R79.89 ELEVATED LFTS: ICD-10-CM

## 2022-05-17 PROCEDURE — G8427 DOCREV CUR MEDS BY ELIG CLIN: HCPCS | Performed by: INTERNAL MEDICINE

## 2022-05-17 PROCEDURE — 1036F TOBACCO NON-USER: CPT | Performed by: INTERNAL MEDICINE

## 2022-05-17 PROCEDURE — G8417 CALC BMI ABV UP PARAM F/U: HCPCS | Performed by: INTERNAL MEDICINE

## 2022-05-17 PROCEDURE — 99214 OFFICE O/P EST MOD 30 MIN: CPT | Performed by: INTERNAL MEDICINE

## 2022-05-17 RX ORDER — ANTACID TABLETS 648 MG/1
1 TABLET, CHEWABLE ORAL 2 TIMES DAILY
Qty: 180 TABLET | Refills: 1 | Status: SHIPPED | OUTPATIENT
Start: 2022-05-17 | End: 2022-11-13

## 2022-05-18 LAB
HEPATITIS B SURFACE ANTIGEN INTERPRETATION: NORMAL
HEPATITIS C ANTIBODY INTERPRETATION: NORMAL

## 2022-08-10 ENCOUNTER — OFFICE VISIT (OUTPATIENT)
Dept: FAMILY MEDICINE CLINIC | Age: 42
End: 2022-08-10
Payer: COMMERCIAL

## 2022-08-10 VITALS
TEMPERATURE: 97.4 F | OXYGEN SATURATION: 97 % | BODY MASS INDEX: 29.45 KG/M2 | HEIGHT: 60 IN | SYSTOLIC BLOOD PRESSURE: 122 MMHG | DIASTOLIC BLOOD PRESSURE: 72 MMHG | RESPIRATION RATE: 18 BRPM | HEART RATE: 98 BPM | WEIGHT: 150 LBS

## 2022-08-10 DIAGNOSIS — M25.512 CHRONIC PAIN IN LEFT SHOULDER: Primary | ICD-10-CM

## 2022-08-10 DIAGNOSIS — G89.29 CHRONIC PAIN IN LEFT SHOULDER: Primary | ICD-10-CM

## 2022-08-10 PROCEDURE — G8427 DOCREV CUR MEDS BY ELIG CLIN: HCPCS | Performed by: PHYSICIAN ASSISTANT

## 2022-08-10 PROCEDURE — 99213 OFFICE O/P EST LOW 20 MIN: CPT | Performed by: PHYSICIAN ASSISTANT

## 2022-08-10 PROCEDURE — 1036F TOBACCO NON-USER: CPT | Performed by: PHYSICIAN ASSISTANT

## 2022-08-10 PROCEDURE — G8417 CALC BMI ABV UP PARAM F/U: HCPCS | Performed by: PHYSICIAN ASSISTANT

## 2022-08-10 RX ORDER — NAPROXEN 500 MG/1
500 TABLET ORAL 2 TIMES DAILY PRN
Qty: 20 TABLET | Refills: 0 | Status: SHIPPED | OUTPATIENT
Start: 2022-08-10

## 2022-08-10 ASSESSMENT — ENCOUNTER SYMPTOMS
DIARRHEA: 0
BACK PAIN: 0
PHOTOPHOBIA: 0
SHORTNESS OF BREATH: 0
COUGH: 0
SORE THROAT: 0
VOMITING: 0
ABDOMINAL PAIN: 0
NAUSEA: 0

## 2022-08-11 ENCOUNTER — OFFICE VISIT (OUTPATIENT)
Dept: FAMILY MEDICINE CLINIC | Age: 42
End: 2022-08-11
Payer: COMMERCIAL

## 2022-08-11 ENCOUNTER — TELEPHONE (OUTPATIENT)
Dept: PRIMARY CARE CLINIC | Age: 42
End: 2022-08-11

## 2022-08-11 VITALS
SYSTOLIC BLOOD PRESSURE: 132 MMHG | HEART RATE: 70 BPM | WEIGHT: 150 LBS | DIASTOLIC BLOOD PRESSURE: 82 MMHG | HEIGHT: 60 IN | BODY MASS INDEX: 29.45 KG/M2 | TEMPERATURE: 97.9 F | OXYGEN SATURATION: 97 %

## 2022-08-11 DIAGNOSIS — M25.512 ACUTE PAIN OF LEFT SHOULDER: Primary | ICD-10-CM

## 2022-08-11 PROCEDURE — 99212 OFFICE O/P EST SF 10 MIN: CPT | Performed by: NURSE PRACTITIONER

## 2022-08-11 PROCEDURE — G8417 CALC BMI ABV UP PARAM F/U: HCPCS | Performed by: NURSE PRACTITIONER

## 2022-08-11 PROCEDURE — 1036F TOBACCO NON-USER: CPT | Performed by: NURSE PRACTITIONER

## 2022-08-11 PROCEDURE — G8427 DOCREV CUR MEDS BY ELIG CLIN: HCPCS | Performed by: NURSE PRACTITIONER

## 2022-08-11 NOTE — TELEPHONE ENCOUNTER
Last Appointment:  3/25/2020  Future Appointments   Date Time Provider Hans Garduno   8/23/2022  8:45 AM Castro Castano MD [facility]      Need a referral to a [facility]) Orthopaedic doctor.      Electronically signed by Ulisses Barclay LPN on 1/49/9006 at 1:87 AM

## 2022-08-11 NOTE — PROGRESS NOTES
Subjective:  Chief Complaint   Patient presents with    Shoulder Pain     Left x1 month, was just seen here 8/10 for same thing       HPI: Patient has left shoulder pain, was seen through express care yesterday, reviewed this note. The patient states she continues to have discomfort even with the naproxen. On further discussion, it was noted that the patient had a work-related injury about 1 month ago, she was seen in the ER for this and plain films were negative. The patient has not had any follow-up then until yesterday. The patient states that she did start a Worker's Compensation claim for this, but has not established with a provider of record. Symptoms have not worsened since onset but have not improved. She does have some work restrictions including no lifting over 10 pounds but she is still having trouble performing this level of activity. Current Outpatient Medications:     naproxen (NAPROSYN) 500 MG tablet, Take 1 tablet by mouth 2 times daily as needed for Pain, Disp: 20 tablet, Rfl: 0    calcium carbonate 648 MG TABS, Take 1 tablet by mouth 2 times daily, Disp: 180 tablet, Rfl: 1    omeprazole (PRILOSEC) 40 MG delayed release capsule, Take 1 capsule by mouth as needed (Symptoms are stable), Disp: 90 capsule, Rfl: 1    vitamin D3 (CHOLECALCIFEROL) 25 MCG (1000 UT) TABS tablet, Take 1 tablet by mouth daily, Disp: 90 tablet, Rfl: 1    naproxen (NAPROSYN) 500 MG tablet, Take 1 tablet by mouth 2 times daily (with meals), Disp: 180 tablet, Rfl: 1    levothyroxine (LEVOTHROID) 100 MCG tablet, Take 1 tablet by mouth daily, Disp: 90 tablet, Rfl: 1   No Known Allergies     Objective:  Vitals:    08/11/22 1225   BP: 132/82   Pulse: 70   Temp: 97.9 °F (36.6 °C)   TempSrc: Temporal   SpO2: 97%   Weight: 150 lb (68 kg)   Height: 5' (1.524 m)        Exam:  Const: Appears healthy and well developed. No signs of acute distress present. Head/Face: Head is normocephalic, atraumatic. Facies is symmetric.   ENMT: Buccal mucosa is moist.  Neck: Trachea midline. Resp: No respiratory distress. Musculo: Pulses are equal bilaterally. Good capillary refill. The patient has tenderness over the anterior aspect of the left shoulder. She is unable to raise her shoulder past 90 degrees. She is unable to perform the scratch test.  She does have normal strength of the upper extremities. She has positive Henderson and Neer's signs. The patient is able to flex and extend at the elbow without difficulty. Skin:Dry and warm. No ecchymosis, abrasions, warmth, or erythema are noted. Neuro: Alert and oriented x3. Speech is intact. No sensory deficits. Neurovascularly intact good sensation to soft touch and pinprick is noted. Psych: Mood and affect are appropriate to situation. Xiomara Paniagua was seen today for shoulder pain. Diagnoses and all orders for this visit:    Acute pain of left shoulder      An  was present. The patient likely needs physical therapy, but as this is a Worker's Compensation case, she needs to be seen by a workers compensation provider. Information given on the local options for this. Patient verbalizes understanding.     Seen By:    NITIN Jha - CNP

## 2022-08-20 NOTE — PROGRESS NOTES
Internal Medicine Mobile Rod Roberts is a 38 y.o. female, presents to the mobile clinic with complaints of hoarse throat since a coughing incident one week ago. She was sleeping and suddenly awoke coughing very hard. After the episode she had a sore/hoarse throat. She denies fever,chills, other cough, body aches, etc.   The hoarseness has been improving. She also complains of a spinning sensation when she moves her head which first occurred a few weeks ago. There is a spinning sensation.  for this visit: Tracey Monroy    Review of Systems   Constitutional: No fever, chills, fatigue, weight loss or gain, night sweats  Respiratory: No dyspnea, wheezing, sputum, or hemoptysis. There was one episode of cough as above  Cardiovascular: No chest pain, angina, dyspnea on exertion, orthopnea, PND   Gastrointestinal: No nausea, vomiting, diarrhea, abdominal pain, or blood per rectum  Genitourinary: No dysuria, nocturia, hesitancy, or incontinence  Musculoskeletal: No pain, numbness, or limited ROM  Neurological:  No headache, numbness, or weakness. There is spinning sensation with moving her head    Objective:   Physical Exam   /66 (Site: Right Upper Arm, Position: Sitting, Cuff Size: Medium Adult)   Pulse 82   Temp 98.2 °F (36.8 °C)   Resp 16   Ht 5' (1.524 m)   Wt 153 lb (69.4 kg)   SpO2 95%   BMI 29.88 kg/m²   General appearance: Alert, Awake, Oriented to Person, Place, and Time   Head: Normocephalic. No masses, lesions or tenderness noted. Eyes: Conjunctivae appear normal, EOMI, PERRL. No scleral icterus or drainage. Ears: External ears normal, no otalgia or erythema. Nose/Sinuses: Nares normal. Septum midline. Mucosa normal. No drainage  Oropharynx: Oropharynx clear with no exudate seen  Neck: Neck supple. No jugular venous distension, lymphadenopathy or thyromegaly. Trachea midline  Lungs: Lungs clear to auscultation bilaterally.   No rhonchi, crackles or wheezes  Heart:  Regular rate and rhythm with auscultation of S1, S2. No rub, murmur or gallop  Abdomen: Soft, non-tender abdomen with bowel sounds in four quadrants. No hepatosplenomegaly or masses. Extremities: No edema, Peripheral pulses palpable in bilateral radial arteries and posterior tibial.   Musculoskeletal: Muscular strength appears intact. No joint effusion, tenderness, swelling or warmth. Skin: Warm and dry, no acute eczematous changes or pruritus. Neuro:  No focal motor or sensory deficits. CN II- XII intact. Assessment & Plan:   Howard Mejia was seen today for follow-up, hypothyroidism and other. Diagnoses and all orders for this visit:    Hypothyroidism, unspecified type  -     TSH; Future  -     levothyroxine (LEVOTHROID) 100 MCG tablet; Take 1 tablet by mouth daily    Prediabetes  -     Comprehensive Metabolic Panel; Future  -     POCT glycosylated hemoglobin (Hb A1C)    Hyperlipidemia, unspecified hyperlipidemia type  -     Lipid Panel; Future    Vitamin D deficiency  -     Vitamin D 25 Hydroxy; Future    Hoarseness of voice    Vertigo    Other orders  -     vitamin D3 (CHOLECALCIFEROL) 25 MCG (1000 UT) TABS tablet; Take 1 tablet by mouth daily  -     loratadine (CLARITIN) 10 MG tablet;  Take 1 tablet by mouth daily    Take Loratadine once daily for 1 week, and then use as needed      Return to clinic in 1 month    Valerie Gambino MD  8/23/2022

## 2022-08-23 ENCOUNTER — OFFICE VISIT (OUTPATIENT)
Dept: FAMILY MEDICINE CLINIC | Age: 42
End: 2022-08-23
Payer: COMMERCIAL

## 2022-08-23 VITALS
HEART RATE: 82 BPM | SYSTOLIC BLOOD PRESSURE: 110 MMHG | OXYGEN SATURATION: 95 % | HEIGHT: 60 IN | TEMPERATURE: 98.2 F | RESPIRATION RATE: 16 BRPM | BODY MASS INDEX: 30.04 KG/M2 | DIASTOLIC BLOOD PRESSURE: 66 MMHG | WEIGHT: 153 LBS

## 2022-08-23 DIAGNOSIS — R73.03 PREDIABETES: ICD-10-CM

## 2022-08-23 DIAGNOSIS — E55.9 VITAMIN D DEFICIENCY: ICD-10-CM

## 2022-08-23 DIAGNOSIS — R42 VERTIGO: ICD-10-CM

## 2022-08-23 DIAGNOSIS — E78.5 HYPERLIPIDEMIA, UNSPECIFIED HYPERLIPIDEMIA TYPE: ICD-10-CM

## 2022-08-23 DIAGNOSIS — E03.9 HYPOTHYROIDISM, UNSPECIFIED TYPE: ICD-10-CM

## 2022-08-23 DIAGNOSIS — E03.9 HYPOTHYROIDISM, UNSPECIFIED TYPE: Primary | ICD-10-CM

## 2022-08-23 DIAGNOSIS — R49.0 HOARSENESS OF VOICE: ICD-10-CM

## 2022-08-23 LAB
ALBUMIN SERPL-MCNC: 4.3 G/DL (ref 3.5–5.2)
ALP BLD-CCNC: 74 U/L (ref 35–104)
ALT SERPL-CCNC: 43 U/L (ref 0–32)
ANION GAP SERPL CALCULATED.3IONS-SCNC: 11 MMOL/L (ref 7–16)
AST SERPL-CCNC: 24 U/L (ref 0–31)
BILIRUB SERPL-MCNC: 0.4 MG/DL (ref 0–1.2)
BUN BLDV-MCNC: 12 MG/DL (ref 6–20)
CALCIUM SERPL-MCNC: 9.2 MG/DL (ref 8.6–10.2)
CHLORIDE BLD-SCNC: 105 MMOL/L (ref 98–107)
CHOLESTEROL, TOTAL: 208 MG/DL (ref 0–199)
CO2: 22 MMOL/L (ref 22–29)
CREAT SERPL-MCNC: 0.7 MG/DL (ref 0.5–1)
GFR AFRICAN AMERICAN: >60
GFR NON-AFRICAN AMERICAN: >60 ML/MIN/1.73
GLUCOSE BLD-MCNC: 134 MG/DL (ref 74–99)
HDLC SERPL-MCNC: 40 MG/DL
LDL CHOLESTEROL CALCULATED: ABNORMAL MG/DL (ref 0–99)
POTASSIUM SERPL-SCNC: 4.4 MMOL/L (ref 3.5–5)
SODIUM BLD-SCNC: 138 MMOL/L (ref 132–146)
TOTAL PROTEIN: 7.6 G/DL (ref 6.4–8.3)
TRIGL SERPL-MCNC: 404 MG/DL (ref 0–149)
TSH SERPL DL<=0.05 MIU/L-ACNC: 1.68 UIU/ML (ref 0.27–4.2)
VITAMIN D 25-HYDROXY: 28 NG/ML (ref 30–100)
VLDLC SERPL CALC-MCNC: ABNORMAL MG/DL

## 2022-08-23 PROCEDURE — G8427 DOCREV CUR MEDS BY ELIG CLIN: HCPCS | Performed by: INTERNAL MEDICINE

## 2022-08-23 PROCEDURE — 1036F TOBACCO NON-USER: CPT | Performed by: INTERNAL MEDICINE

## 2022-08-23 PROCEDURE — 83036 HEMOGLOBIN GLYCOSYLATED A1C: CPT | Performed by: INTERNAL MEDICINE

## 2022-08-23 PROCEDURE — G8417 CALC BMI ABV UP PARAM F/U: HCPCS | Performed by: INTERNAL MEDICINE

## 2022-08-23 PROCEDURE — 99213 OFFICE O/P EST LOW 20 MIN: CPT | Performed by: INTERNAL MEDICINE

## 2022-08-23 RX ORDER — LORATADINE 10 MG/1
10 TABLET ORAL DAILY
Qty: 30 TABLET | Refills: 0 | Status: SHIPPED | OUTPATIENT
Start: 2022-08-23 | End: 2022-09-22

## 2022-08-23 RX ORDER — IBUPROFEN 600 MG/1
600 TABLET ORAL EVERY 6 HOURS PRN
COMMUNITY
Start: 2022-08-22

## 2022-08-23 RX ORDER — LEVOTHYROXINE SODIUM 0.1 MG/1
100 TABLET ORAL DAILY
Qty: 90 TABLET | Refills: 1 | Status: SHIPPED | OUTPATIENT
Start: 2022-08-23

## 2022-08-23 RX ORDER — MELATONIN
1000 DAILY
Qty: 90 TABLET | Refills: 1 | Status: SHIPPED | OUTPATIENT
Start: 2022-08-23

## 2022-08-23 SDOH — ECONOMIC STABILITY: FOOD INSECURITY: WITHIN THE PAST 12 MONTHS, THE FOOD YOU BOUGHT JUST DIDN'T LAST AND YOU DIDN'T HAVE MONEY TO GET MORE.: NEVER TRUE

## 2022-08-23 SDOH — ECONOMIC STABILITY: FOOD INSECURITY: WITHIN THE PAST 12 MONTHS, YOU WORRIED THAT YOUR FOOD WOULD RUN OUT BEFORE YOU GOT MONEY TO BUY MORE.: SOMETIMES TRUE

## 2022-08-23 ASSESSMENT — PATIENT HEALTH QUESTIONNAIRE - PHQ9
SUM OF ALL RESPONSES TO PHQ QUESTIONS 1-9: 1
SUM OF ALL RESPONSES TO PHQ QUESTIONS 1-9: 1
SUM OF ALL RESPONSES TO PHQ9 QUESTIONS 1 & 2: 1
SUM OF ALL RESPONSES TO PHQ QUESTIONS 1-9: 1
1. LITTLE INTEREST OR PLEASURE IN DOING THINGS: 0
SUM OF ALL RESPONSES TO PHQ QUESTIONS 1-9: 1
2. FEELING DOWN, DEPRESSED OR HOPELESS: 1

## 2022-08-23 ASSESSMENT — SOCIAL DETERMINANTS OF HEALTH (SDOH): HOW HARD IS IT FOR YOU TO PAY FOR THE VERY BASICS LIKE FOOD, HOUSING, MEDICAL CARE, AND HEATING?: NOT HARD AT ALL

## 2022-09-26 NOTE — PROGRESS NOTES
Internal Medicine Mobile Chucky Mccabe is a 38 y.o. female, presents to the mobile clinic for follow up of prediabetes and hypothyroidism.  for this visit: Sherrill Valdivia    Review of Systems   Constitutional: No fever, chills, fatigue, weight loss or gain, night sweats  Respiratory: No cough, dyspnea, wheezing, sputum, or hemoptysis  Cardiovascular: No chest pain, angina, dyspnea on exertion, orthopnea, PND   Musculoskeletal:  Left shoulder pain with movement  Neurological:  No headache, dizziness, numbness, or weakness    Objective:   Physical Exam   /70 (Site: Left Upper Arm, Position: Sitting, Cuff Size: Medium Adult)   Pulse (!) 106   Temp 98.1 °F (36.7 °C)   Resp 16   Ht 5' (1.524 m)   Wt 155 lb (70.3 kg)   SpO2 96%   BMI 30.27 kg/m²   General appearance: Alert, Awake, Oriented to Person, Place, and Time   Head: Normocephalic. No masses, lesions or tenderness noted. Neck: Neck supple. No jugular venous distension, lymphadenopathy or thyromegaly. Trachea midline  Lungs: Lungs clear to auscultation bilaterally. No rhonchi, crackles or wheezes  Heart:  Regular rate and rhythm with auscultation of S1, S2. No rub, murmur or gallop  Abdomen: Soft, non-tender abdomen with bowel sounds in four quadrants. No hepatosplenomegaly or masses. Extremities: No edema, Peripheral pulses palpable in bilateral radial arteries and posterior tibial.   Musculoskeletal: Pain with ROM left shoulder. Chest wall tendernes on palpation  Skin: Warm and dry, no acute eczematous changes or pruritus. Neuro:  No focal motor or sensory deficits. CN II- XII intact. Assessment & Plan:   Lyn Poe was seen today for hypothyroidism. Diagnoses and all orders for this visit:    Prediabetes    Hypothyroidism, unspecified type    Hyperlipidemia, unspecified hyperlipidemia type    All labs  reviewed with patient.     Shoulder pain being addressed by Workers Comp physician at work  Diet for prediabetes and hyperlipidemia discussed  Return to clinic in 3 months    Lonnie Lai MD, M.D.,  9/27/2022

## 2022-09-27 ENCOUNTER — OFFICE VISIT (OUTPATIENT)
Dept: FAMILY MEDICINE CLINIC | Age: 42
End: 2022-09-27
Payer: COMMERCIAL

## 2022-09-27 VITALS
HEART RATE: 106 BPM | WEIGHT: 155 LBS | BODY MASS INDEX: 30.43 KG/M2 | HEIGHT: 60 IN | DIASTOLIC BLOOD PRESSURE: 70 MMHG | TEMPERATURE: 98.1 F | RESPIRATION RATE: 16 BRPM | OXYGEN SATURATION: 96 % | SYSTOLIC BLOOD PRESSURE: 110 MMHG

## 2022-09-27 DIAGNOSIS — E78.5 HYPERLIPIDEMIA, UNSPECIFIED HYPERLIPIDEMIA TYPE: ICD-10-CM

## 2022-09-27 DIAGNOSIS — R73.03 PREDIABETES: Primary | ICD-10-CM

## 2022-09-27 DIAGNOSIS — E03.9 HYPOTHYROIDISM, UNSPECIFIED TYPE: ICD-10-CM

## 2022-09-27 LAB — HBA1C MFR BLD: 6.1 %

## 2022-09-27 PROCEDURE — 99214 OFFICE O/P EST MOD 30 MIN: CPT | Performed by: INTERNAL MEDICINE

## 2022-09-27 PROCEDURE — G8417 CALC BMI ABV UP PARAM F/U: HCPCS | Performed by: INTERNAL MEDICINE

## 2022-09-27 PROCEDURE — 1036F TOBACCO NON-USER: CPT | Performed by: INTERNAL MEDICINE

## 2022-09-27 PROCEDURE — G8427 DOCREV CUR MEDS BY ELIG CLIN: HCPCS | Performed by: INTERNAL MEDICINE

## 2022-11-18 ENCOUNTER — OFFICE VISIT (OUTPATIENT)
Dept: FAMILY MEDICINE CLINIC | Age: 42
End: 2022-11-18
Payer: COMMERCIAL

## 2022-11-18 VITALS
SYSTOLIC BLOOD PRESSURE: 112 MMHG | HEART RATE: 102 BPM | OXYGEN SATURATION: 99 % | TEMPERATURE: 98.5 F | BODY MASS INDEX: 30.43 KG/M2 | DIASTOLIC BLOOD PRESSURE: 70 MMHG | RESPIRATION RATE: 18 BRPM | WEIGHT: 155 LBS | HEIGHT: 60 IN

## 2022-11-18 DIAGNOSIS — J02.0 STREPTOCOCCUS PHARYNGITIS: Primary | ICD-10-CM

## 2022-11-18 LAB
INFLUENZA A ANTIGEN, POC: NORMAL
INFLUENZA B ANTIGEN, POC: NORMAL
Lab: NORMAL
PERFORMING INSTRUMENT: NORMAL
QC PASS/FAIL: NORMAL
S PYO AG THROAT QL: POSITIVE
SARS-COV-2, POC: NORMAL

## 2022-11-18 PROCEDURE — 87880 STREP A ASSAY W/OPTIC: CPT | Performed by: STUDENT IN AN ORGANIZED HEALTH CARE EDUCATION/TRAINING PROGRAM

## 2022-11-18 PROCEDURE — G8417 CALC BMI ABV UP PARAM F/U: HCPCS | Performed by: STUDENT IN AN ORGANIZED HEALTH CARE EDUCATION/TRAINING PROGRAM

## 2022-11-18 PROCEDURE — 87426 SARSCOV CORONAVIRUS AG IA: CPT | Performed by: STUDENT IN AN ORGANIZED HEALTH CARE EDUCATION/TRAINING PROGRAM

## 2022-11-18 PROCEDURE — G8427 DOCREV CUR MEDS BY ELIG CLIN: HCPCS | Performed by: STUDENT IN AN ORGANIZED HEALTH CARE EDUCATION/TRAINING PROGRAM

## 2022-11-18 PROCEDURE — 1036F TOBACCO NON-USER: CPT | Performed by: STUDENT IN AN ORGANIZED HEALTH CARE EDUCATION/TRAINING PROGRAM

## 2022-11-18 PROCEDURE — 99213 OFFICE O/P EST LOW 20 MIN: CPT | Performed by: STUDENT IN AN ORGANIZED HEALTH CARE EDUCATION/TRAINING PROGRAM

## 2022-11-18 PROCEDURE — G8484 FLU IMMUNIZE NO ADMIN: HCPCS | Performed by: STUDENT IN AN ORGANIZED HEALTH CARE EDUCATION/TRAINING PROGRAM

## 2022-11-18 PROCEDURE — 87804 INFLUENZA ASSAY W/OPTIC: CPT | Performed by: STUDENT IN AN ORGANIZED HEALTH CARE EDUCATION/TRAINING PROGRAM

## 2022-11-18 RX ORDER — AMOXICILLIN 500 MG/1
500 CAPSULE ORAL 2 TIMES DAILY
Qty: 20 CAPSULE | Refills: 0 | Status: SHIPPED | OUTPATIENT
Start: 2022-11-18 | End: 2022-11-28

## 2022-11-18 ASSESSMENT — ENCOUNTER SYMPTOMS
SINUS PAIN: 1
NAUSEA: 0
SORE THROAT: 1
DIARRHEA: 0
COUGH: 1
VOMITING: 0
SINUS PRESSURE: 1
SHORTNESS OF BREATH: 0

## 2022-11-18 NOTE — LETTER
[address]  Phone: [phone]  Fax: [phone]    Jaja Tyler MD        November 18, 2022     Patient: Kristen Garcia   YOB: 1980   Date of Visit: 11/18/2022       To Whom It May Concern: It is my medical opinion that Kristen Garcia may return to work on 11/20/2022. If you have any questions or concerns, please don't hesitate to call.     Sincerely,    Jaja Tyler MD

## 2022-11-18 NOTE — PROGRESS NOTES
WALK-IN CARE CLINIC VISIT    22  Name: Cathie Ramos   : 1980   Age: 38 y.o. Sex: female        Assessment & Plan:       ICD-10-CM    1. Streptococcus pharyngitis  J02.0 POCT COVID-19, Antigen     POCT Influenza A/B Antigen (BD Veritor)     POCT rapid strep A     amoxicillin (AMOXIL) 500 MG capsule          History and exam consistent with Strep pharyngitis, rapid Strep positive. Treat with amoxicillin. Provided supportive care instructions, return precautions. Advised must be on antibiotics for 24 hours prior to return to work, provided note. Counseled patient regarding above diagnosis, including possible risks and complications, especially if left uncontrolled. Counseled patient as appropriate and relevant regarding any possible side effects, risks, and alternatives to treatment; the patient verbalizes understanding, and is in agreement with the plan as detailed above. All educational materials and instructions were discussed and included on the After Visit Summary. All questions answered to the patient's satisfaction. The patient was advised to call for any concerns or return if any of the signs or symptoms worsen. This provider and patient were wearing surgical masks. We practiced social distancing when appropriate during visit due to COVID-19 pandemic. Subjective:     Chief Complaint   Patient presents with    Headache    Otalgia       Patient presents with headache and ear pain  Reports sick for 3 weeks  New fever, headache, ear pain since last night  Has taken ibuprofen only  Works with a lot of people    Review of Systems   Constitutional:  Positive for chills and fever. HENT:  Positive for congestion, ear pain, postnasal drip, sinus pressure, sinus pain and sore throat. Respiratory:  Positive for cough. Negative for shortness of breath. Cardiovascular:  Positive for chest pain (with cough).    Gastrointestinal:  Negative for diarrhea, nausea and vomiting. Musculoskeletal:  Positive for arthralgias and myalgias. Skin:  Negative for rash. Neurological:  Positive for headaches. Negative for light-headedness.      Medical History:     Patient Active Problem List   Diagnosis    History of gestational diabetes    Trapezius strain    Acquired hypothyroidism    Vertigo        Past Medical History:   Diagnosis Date    Gestational diabetes 2015    Gestational diabetes 14    States  told her to check blood glucose once a week, diet controlled    Thyroid disease     Patient states she is taking 100 mcg of Synthroid prescribed through her doctor in [facility]    Trapezius strain 3/16/2021       Past Surgical History:   Procedure Laterality Date     SECTION  ,        Family History   Problem Relation Age of Onset    Diabetes Mother     Diabetes Brother     Diabetes Brother        Medications:     Current Outpatient Medications:     ibuprofen (ADVIL;MOTRIN) 600 MG tablet, Take 600 mg by mouth every 6 hours as needed for Pain, Disp: , Rfl:     levothyroxine (LEVOTHROID) 100 MCG tablet, Take 1 tablet by mouth daily, Disp: 90 tablet, Rfl: 1    vitamin D3 (CHOLECALCIFEROL) 25 MCG (1000 UT) TABS tablet, Take 1 tablet by mouth daily, Disp: 90 tablet, Rfl: 1    naproxen (NAPROSYN) 500 MG tablet, Take 1 tablet by mouth 2 times daily as needed for Pain (Patient not taking: No sig reported), Disp: 20 tablet, Rfl: 0    omeprazole (PRILOSEC) 40 MG delayed release capsule, Take 1 capsule by mouth as needed (Symptoms are stable) (Patient not taking: No sig reported), Disp: 90 capsule, Rfl: 1    naproxen (NAPROSYN) 500 MG tablet, Take 1 tablet by mouth 2 times daily (with meals) (Patient not taking: No sig reported), Disp: 180 tablet, Rfl: 1    Allergies:   No Known Allergies    Social History:     Social History     Socioeconomic History    Marital status: Single     Spouse name: Not on file    Number of children: 0    Years of education: 9    Highest education level: Not on file   Occupational History    Not on file   Tobacco Use    Smoking status: Never    Smokeless tobacco: Never   Vaping Use    Vaping Use: Never used   Substance and Sexual Activity    Alcohol use: No    Drug use: No    Sexual activity: Not Currently     Partners: Male   Other Topics Concern    Not on file   Social History Narrative    Not on file     Social Determinants of Health     Financial Resource Strain: Low Risk     Difficulty of Paying Living Expenses: Not hard at all   Food Insecurity: Food Insecurity Present    Worried About [address] in the Last Year: Sometimes true    Ran Out of Food in the Last Year: Never true   Transportation Needs: Not on file   Physical Activity: Not on file   Stress: Not on file   Social Connections: Not on file   Intimate Partner Violence: Not on file   Housing Stability: Not on file       Physical Exam:     Vitals:    11/18/22 0935   BP: 112/70   Pulse: (!) 102   Resp: 18   Temp: 98.5 °F (36.9 °C)   TempSrc: Temporal   SpO2: 99%   Weight: 155 lb (70.3 kg)   Height: 5' (1.524 m)        Physical Exam  Vitals and nursing note reviewed. Constitutional:       General: She is not in acute distress. Appearance: Normal appearance. She is obese. She is not ill-appearing or diaphoretic. HENT:      Right Ear: Tympanic membrane, ear canal and external ear normal.      Left Ear: Tympanic membrane, ear canal and external ear normal.      Nose: Mucosal edema and congestion present. No rhinorrhea. Right Sinus: Frontal sinus tenderness present. No maxillary sinus tenderness. Left Sinus: Frontal sinus tenderness present. No maxillary sinus tenderness. Mouth/Throat:      Mouth: Mucous membranes are moist.      Pharynx: Oropharynx is clear. Uvula midline. Posterior oropharyngeal erythema present. No oropharyngeal exudate. Tonsils: No tonsillar exudate or tonsillar abscesses. 3+ on the right. 3+ on the left.    Eyes:      Extraocular Movements: Extraocular movements intact. Conjunctiva/sclera: Conjunctivae normal.   Cardiovascular:      Rate and Rhythm: Normal rate and regular rhythm. Heart sounds: Normal heart sounds. Pulmonary:      Effort: Pulmonary effort is normal. No respiratory distress. Breath sounds: Normal breath sounds. No wheezing, rhonchi or rales. Musculoskeletal:      Cervical back: Normal range of motion and neck supple. Skin:     General: Skin is warm and dry. Neurological:      Mental Status: She is alert and oriented to person, place, and time. Testing:     Orders Placed This Encounter   Procedures    POCT COVID-19, Antigen     Order Specific Question:   Is this test for diagnosis or screening? Answer:   Screening     Order Specific Question:   Symptomatic for COVID-19 as defined by CDC? Answer:   No     Order Specific Question:   Date of Symptom Onset     Answer:   N/A     Order Specific Question:   Hospitalized for COVID-19? Answer:   No     Order Specific Question:   Admitted to ICU for COVID-19? Answer:   No     Order Specific Question:   Employed in healthcare setting? Answer:   No     Order Specific Question:   Resident in a congregate (group) care setting? Answer:   No     Order Specific Question:   Pregnant? Answer:   Unknown     Order Specific Question:   Previously tested for COVID-19?      Answer:   No    POCT Influenza A/B Antigen (BD Veritor)    POCT rapid strep A        Recent Results (from the past 24 hour(s))   POCT COVID-19, Antigen    Collection Time: 11/18/22  9:57 AM   Result Value Ref Range    SARS-COV-2, POC Not-Detected Not Detected    Lot Number [id number]     QC Pass/Fail pass     Performing Instrument BD Veritor    POCT Influenza A/B Antigen (BD Veritor)    Collection Time: 11/18/22 10:46 AM   Result Value Ref Range    Inflenza A Ag neg     Influenza B Ag neg    POCT rapid strep A    Collection Time: 11/18/22 10:47 AM   Result Value Ref Range Strep A Ag Positive (A) None Detected

## 2023-02-21 ENCOUNTER — OFFICE VISIT (OUTPATIENT)
Dept: PRIMARY CARE CLINIC | Age: 43
End: 2023-02-21

## 2023-02-21 VITALS
TEMPERATURE: 97.9 F | BODY MASS INDEX: 30.67 KG/M2 | OXYGEN SATURATION: 98 % | DIASTOLIC BLOOD PRESSURE: 68 MMHG | RESPIRATION RATE: 16 BRPM | HEART RATE: 89 BPM | WEIGHT: 156.2 LBS | SYSTOLIC BLOOD PRESSURE: 106 MMHG | HEIGHT: 60 IN

## 2023-02-21 DIAGNOSIS — E03.9 HYPOTHYROIDISM, UNSPECIFIED TYPE: ICD-10-CM

## 2023-02-21 DIAGNOSIS — E55.9 VITAMIN D DEFICIENCY: Primary | ICD-10-CM

## 2023-02-21 DIAGNOSIS — R73.03 PREDIABETES: ICD-10-CM

## 2023-02-21 DIAGNOSIS — K21.9 GASTROESOPHAGEAL REFLUX DISEASE WITHOUT ESOPHAGITIS: ICD-10-CM

## 2023-02-21 DIAGNOSIS — E78.1 HYPERTRIGLYCERIDEMIA: ICD-10-CM

## 2023-02-21 DIAGNOSIS — Z12.31 BREAST CANCER SCREENING BY MAMMOGRAM: ICD-10-CM

## 2023-02-21 LAB
CHOLESTEROL, TOTAL: 194 MG/DL (ref 0–199)
HBA1C MFR BLD: 6.3 % (ref 4–5.6)
HDLC SERPL-MCNC: 40 MG/DL
LDL CHOLESTEROL CALCULATED: 76 MG/DL (ref 0–99)
TRIGL SERPL-MCNC: 392 MG/DL (ref 0–149)
TSH SERPL DL<=0.05 MIU/L-ACNC: 2.18 UIU/ML (ref 0.27–4.2)
VLDLC SERPL CALC-MCNC: 78 MG/DL

## 2023-02-21 RX ORDER — OMEPRAZOLE 40 MG/1
40 CAPSULE, DELAYED RELEASE ORAL PRN
Qty: 90 CAPSULE | Refills: 1 | Status: SHIPPED | OUTPATIENT
Start: 2023-02-21

## 2023-02-21 RX ORDER — MELATONIN
1000 DAILY
Qty: 90 TABLET | Refills: 1 | Status: SHIPPED | OUTPATIENT
Start: 2023-02-21

## 2023-02-21 RX ORDER — LEVOTHYROXINE SODIUM 0.1 MG/1
100 TABLET ORAL DAILY
Qty: 90 TABLET | Refills: 1 | Status: SHIPPED | OUTPATIENT
Start: 2023-02-21

## 2023-02-21 SDOH — ECONOMIC STABILITY: INCOME INSECURITY: HOW HARD IS IT FOR YOU TO PAY FOR THE VERY BASICS LIKE FOOD, HOUSING, MEDICAL CARE, AND HEATING?: HARD

## 2023-02-21 SDOH — ECONOMIC STABILITY: HOUSING INSECURITY
IN THE LAST 12 MONTHS, WAS THERE A TIME WHEN YOU DID NOT HAVE A STEADY PLACE TO SLEEP OR SLEPT IN A SHELTER (INCLUDING NOW)?: NO

## 2023-02-21 SDOH — ECONOMIC STABILITY: FOOD INSECURITY: WITHIN THE PAST 12 MONTHS, THE FOOD YOU BOUGHT JUST DIDN'T LAST AND YOU DIDN'T HAVE MONEY TO GET MORE.: SOMETIMES TRUE

## 2023-02-21 SDOH — ECONOMIC STABILITY: FOOD INSECURITY: WITHIN THE PAST 12 MONTHS, YOU WORRIED THAT YOUR FOOD WOULD RUN OUT BEFORE YOU GOT MONEY TO BUY MORE.: SOMETIMES TRUE

## 2023-02-21 ASSESSMENT — ENCOUNTER SYMPTOMS
ABDOMINAL PAIN: 0
CONSTIPATION: 0
SHORTNESS OF BREATH: 0
WHEEZING: 0
DIARRHEA: 0
NAUSEA: 0
VOMITING: 0
COUGH: 0

## 2023-02-21 NOTE — PROGRESS NOTES
[facility]  DATE OF VISIT : 2023    Patient : Alis Vick   Age : 38 y.o.  : 1980   MRN : [id number]   ______________________________________________________________________    Chief Complaint :   Chief Complaint   Patient presents with    Follow-up Chronic Condition     Patient is here today to follow up for her TSH. Has been having a lot of hair loss, gaining weight, feeling tired and a bit fatigued. Last TSH level was normal. States overall she is feeling well. Medications have been reviewed and is complying, refills needed today. HPI : Alis Vick is 38 y.o. female who presented to the clinic today for office visit. Hypothyroidism: Levothyroxine 100mcg daily. Last TSH 22: 1.680. Complaining of hair thinning and weight gain. HLD/Hypertriglyceredemia: Last LP 22: , Tri 404, Unable to calculate LDL. ASCVD risk 1%. Vit. D Def: on vitamin D supplements. Last Vit D lever 22: 28.     Pre diabetes: Last A1C 22: 6.1. Diet controlled. I reviewed the patient's past medications, allergies and past medical history during this visit. Past Medical History :  Ob/Gyn:  Last PAP smear- 2021: negative. Dr. Rajan Conway. Ob Hx- - both C-Sections  Mammogram-2021 with US: Both  negative for malignancy, recommendation for mammo + US yearly.        Past Medical History:   Diagnosis Date    Gestational diabetes 2015    Gestational diabetes 2016    States  told her to check blood glucose once a week, diet controlled    Thyroid disease     Patient states she is taking 100 mcg of Synthroid prescribed through her doctor in [facility] 2021     Past Surgical History:   Procedure Laterality Date     SECTION  ,        Social History :  Social History       Tobacco History       Smoking Status  Never      Smokeless Tobacco Use  Never              Alcohol History Alcohol Use Status  No              Drug Use       Drug Use Status  No              Sexual Activity       Sexually Active  Not Currently Partners  Male                     Allergies :   No Known Allergies    Medication List :    Current Outpatient Medications   Medication Sig Dispense Refill    vitamin D3 (CHOLECALCIFEROL) 25 MCG (1000 UT) TABS tablet Take 1 tablet by mouth daily 90 tablet 1    omeprazole (PRILOSEC) 40 MG delayed release capsule Take 1 capsule by mouth as needed (Symptoms are stable) 90 capsule 1    levothyroxine (LEVOTHROID) 100 MCG tablet Take 1 tablet by mouth daily 90 tablet 1     No current facility-administered medications for this visit. Review of Systems :  Review of Systems   Constitutional:  Negative for chills, fatigue and fever. Respiratory:  Negative for cough, shortness of breath and wheezing. Cardiovascular:  Negative for chest pain, palpitations and leg swelling. Gastrointestinal:  Negative for abdominal pain, constipation, diarrhea, nausea and vomiting. Endocrine: Negative for polydipsia and polyuria. Neurological:  Negative for dizziness, weakness, light-headedness, numbness and headaches.   ______________________________________________________________________    Physical Exam :    Vitals: /68 (Site: Left Upper Arm, Position: Sitting, Cuff Size: Large Adult)   Pulse 89   Temp 97.9 °F (36.6 °C) (Temporal)   Resp 16   Ht 5' (1.524 m)   Wt 156 lb 3.2 oz (70.9 kg)   LMP 12/20/2022 (Approximate)   SpO2 98%   BMI 30.51 kg/m²   Physical Exam  Vitals reviewed. Constitutional:       General: She is not in acute distress. Appearance: Normal appearance. She is not ill-appearing. Neck:      Thyroid: No thyroid mass, thyromegaly or thyroid tenderness. Cardiovascular:      Rate and Rhythm: Normal rate and regular rhythm. Pulses: Normal pulses. Heart sounds: Normal heart sounds. No murmur heard.   Pulmonary:      Effort: Pulmonary effort is normal. No respiratory distress. Breath sounds: Normal breath sounds. No wheezing. Abdominal:      General: Bowel sounds are normal. There is no distension. Palpations: Abdomen is soft. Tenderness: There is no abdominal tenderness. Musculoskeletal:      Right lower leg: No edema. Left lower leg: No edema. Neurological:      Mental Status: She is alert.         ___________________    Assessment & Plan :    1. Hypothyroidism, unspecified type  - continue LEVO 100mcg daily  - will adjust dose based on labs  - levothyroxine (LEVOTHROID) 100 MCG tablet; Take 1 tablet by mouth daily  Dispense: 90 tablet; Refill: 1  - TSH; Future    2. Vitamin D deficiency  - continue vitamin D supplements  - vitamin D3 (CHOLECALCIFEROL) 25 MCG (1000 UT) TABS tablet; Take 1 tablet by mouth daily  Dispense: 90 tablet; Refill: 1    3. Prediabetes  - recheck A1C  - discussed with patient the importance of lifestyle and dietary modifications  - continue off meds for now  - Hemoglobin A1C; Future    4. Hypertriglyceridemia  - recheck LP  - Continue off meds  - ASCVD risk 1%  - discussed importance of lifestyle and dietary modifications  - discussed possibility of requiring a triglyceride lowering medication   - LIPID PANEL; Future    5. Gastroesophageal reflux disease without esophagitis  - has been taking meds only as needed  - omeprazole (PRILOSEC) 40 MG delayed release capsule; Take 1 capsule by mouth as needed (Symptoms are stable)  Dispense: 90 capsule; Refill: 1      Educational materials and/or home exercises printed for patient's review and were included in patient instructions on his/her After Visit Summary and given to patient at the end of visit. Counseled regarding above diagnosis, including possible risks and complications,  especially if left uncontrolled.      Counseled regarding the possible side effects, risks, benefits and alternatives to treatment; patient and/or guardian verbalizes understanding, agrees, feels comfortable with and wishes to proceed with above treatment plan. Advised patient to call with any new medication issues, and read all Rx info from pharmacy to assure aware of all possible risks and side effects of medication before taking. Reviewed age and gender appropriate health screening exams and vaccinations. Advised patient regarding importance of keeping up with recommended health maintenance and to schedule as soon as possible if overdue, as this is important in assessing for undiagnosed pathology, especially cancer, as well as protecting against potentially harmful/life threatening disease. Patient and/or guardian verbalizes understanding and agrees with above counseling, assessment and plan. All questions answered    Additional plan and future considerations:   RTO in 6mos for office visit. Return to Office: No follow-ups on file.     Electronically signed by Lamine Schuler MD on 2/21/2023 at 9:43 AM

## 2023-02-21 NOTE — LETTER
[facility]  Phone: [phone]  Fax: [phone]    Vinod Romero MD        February 21, 2023     Patient: Tiffany Corrigan   YOB: 1980   Date of Visit: 2/21/2023       To Whom it May Concern:    Tiffany Corrigan was seen in my clinic on 2/21/2023. She may return to work on 2/21/23. If you have any questions or concerns, please don't hesitate to call.     Sincerely,         Vinod Romero MD

## 2023-02-21 NOTE — LETTER
[facility]  Phone: [phone]  Fax: [phone]    Eusebio Contreras MD        February 21, 2023     Patient: Oriana Pressley   YOB: 1980   Date of Visit: 2/21/2023       To Whom It May Concern: It is my medical opinion that Oriana Pressley can return to work today 2/21/23 after her doctor's visit. If you have any questions or concerns, please don't hesitate to call.     Sincerely,        Eusebio Contreras MD

## 2023-05-25 DIAGNOSIS — E03.9 HYPOTHYROIDISM, UNSPECIFIED TYPE: ICD-10-CM

## 2023-05-25 RX ORDER — LEVOTHYROXINE SODIUM 0.1 MG/1
100 TABLET ORAL DAILY
Qty: 90 TABLET | Refills: 1 | Status: SHIPPED | OUTPATIENT
Start: 2023-05-25

## 2023-08-01 ENCOUNTER — OFFICE VISIT (OUTPATIENT)
Dept: PRIMARY CARE CLINIC | Age: 43
End: 2023-08-01
Payer: COMMERCIAL

## 2023-08-01 VITALS
BODY MASS INDEX: 30.19 KG/M2 | OXYGEN SATURATION: 98 % | HEIGHT: 60 IN | WEIGHT: 153.8 LBS | DIASTOLIC BLOOD PRESSURE: 80 MMHG | TEMPERATURE: 97.3 F | HEART RATE: 80 BPM | SYSTOLIC BLOOD PRESSURE: 120 MMHG | RESPIRATION RATE: 16 BRPM

## 2023-08-01 DIAGNOSIS — E03.9 ACQUIRED HYPOTHYROIDISM: ICD-10-CM

## 2023-08-01 DIAGNOSIS — R73.03 PREDIABETES: Primary | ICD-10-CM

## 2023-08-01 DIAGNOSIS — R73.03 PREDIABETES: ICD-10-CM

## 2023-08-01 DIAGNOSIS — Z12.12 SCREENING FOR COLORECTAL CANCER: ICD-10-CM

## 2023-08-01 DIAGNOSIS — Z12.11 SCREENING FOR COLORECTAL CANCER: ICD-10-CM

## 2023-08-01 LAB
HBA1C MFR BLD: 6 % (ref 4–5.6)
HBA1C MFR BLD: 6.1 %
TSH SERPL DL<=0.05 MIU/L-ACNC: 1.55 UIU/ML (ref 0.27–4.2)

## 2023-08-01 PROCEDURE — G8427 DOCREV CUR MEDS BY ELIG CLIN: HCPCS | Performed by: FAMILY MEDICINE

## 2023-08-01 PROCEDURE — 83037 HB GLYCOSYLATED A1C HOME DEV: CPT | Performed by: FAMILY MEDICINE

## 2023-08-01 PROCEDURE — 99214 OFFICE O/P EST MOD 30 MIN: CPT | Performed by: FAMILY MEDICINE

## 2023-08-01 PROCEDURE — G8417 CALC BMI ABV UP PARAM F/U: HCPCS | Performed by: FAMILY MEDICINE

## 2023-08-01 PROCEDURE — 1036F TOBACCO NON-USER: CPT | Performed by: FAMILY MEDICINE

## 2023-08-01 ASSESSMENT — ENCOUNTER SYMPTOMS
VOMITING: 0
WHEEZING: 0
COUGH: 0
CONSTIPATION: 0
ABDOMINAL PAIN: 0
NAUSEA: 0
SHORTNESS OF BREATH: 0
DIARRHEA: 0

## 2023-08-16 LAB — NONINV COLON CA DNA+OCC BLD SCRN STL QL: NEGATIVE

## 2023-12-13 DIAGNOSIS — E03.9 HYPOTHYROIDISM, UNSPECIFIED TYPE: ICD-10-CM

## 2023-12-13 RX ORDER — LEVOTHYROXINE SODIUM 0.1 MG/1
100 TABLET ORAL DAILY
Qty: 90 TABLET | Refills: 1 | Status: SHIPPED | OUTPATIENT
Start: 2023-12-13

## 2024-01-15 ENCOUNTER — TELEPHONE (OUTPATIENT)
Age: 44
End: 2024-01-15

## 2024-01-15 NOTE — TELEPHONE ENCOUNTER
Tried calling patient to transfer her from [place] to our clinic here in [place] with Doctor Zack if she wishes to do so but her voicemail is not yet up. Patient did not answer and was unable to leave a voicemail at this time.

## 2024-01-31 ENCOUNTER — TELEPHONE (OUTPATIENT)
Age: 44
End: 2024-01-31

## 2024-01-31 NOTE — TELEPHONE ENCOUNTER
Tried reaching out to patient and her phone just went straight to message stating \"I'm sorry but this person has a voice mailbox that has not been yet set up\".     Will try again later to see if patient is continuing care with Doctor Dixon here in [place].

## 2024-03-01 ENCOUNTER — OFFICE VISIT (OUTPATIENT)
Age: 44
End: 2024-03-01
Payer: COMMERCIAL

## 2024-03-01 VITALS
WEIGHT: 158.1 LBS | TEMPERATURE: 97.2 F | HEART RATE: 90 BPM | DIASTOLIC BLOOD PRESSURE: 80 MMHG | HEIGHT: 60 IN | BODY MASS INDEX: 31.04 KG/M2 | RESPIRATION RATE: 16 BRPM | SYSTOLIC BLOOD PRESSURE: 124 MMHG | OXYGEN SATURATION: 96 %

## 2024-03-01 DIAGNOSIS — R92.2 DENSE BREAST: ICD-10-CM

## 2024-03-01 DIAGNOSIS — Z01.419 WELL WOMAN EXAM WITH ROUTINE GYNECOLOGICAL EXAM: ICD-10-CM

## 2024-03-01 DIAGNOSIS — R92.30 BREAST DENSITY: Primary | ICD-10-CM

## 2024-03-01 DIAGNOSIS — E03.9 HYPOTHYROIDISM, UNSPECIFIED TYPE: ICD-10-CM

## 2024-03-01 DIAGNOSIS — R92.30 DENSE BREAST: ICD-10-CM

## 2024-03-01 DIAGNOSIS — N89.8 VAGINAL DISCHARGE: ICD-10-CM

## 2024-03-01 DIAGNOSIS — Z12.31 BREAST CANCER SCREENING BY MAMMOGRAM: Primary | ICD-10-CM

## 2024-03-01 PROCEDURE — G8484 FLU IMMUNIZE NO ADMIN: HCPCS | Performed by: FAMILY MEDICINE

## 2024-03-01 PROCEDURE — 99396 PREV VISIT EST AGE 40-64: CPT | Performed by: FAMILY MEDICINE

## 2024-03-01 PROCEDURE — 87210 SMEAR WET MOUNT SALINE/INK: CPT | Performed by: FAMILY MEDICINE

## 2024-03-01 RX ORDER — LEVOTHYROXINE SODIUM 0.1 MG/1
100 TABLET ORAL DAILY
Qty: 90 TABLET | Refills: 1 | Status: SHIPPED | OUTPATIENT
Start: 2024-03-01

## 2024-03-01 RX ORDER — LEVOTHYROXINE SODIUM 0.1 MG/1
100 TABLET ORAL DAILY
Qty: 90 TABLET | Refills: 1 | Status: CANCELLED | OUTPATIENT
Start: 2024-03-01

## 2024-03-01 SDOH — ECONOMIC STABILITY: INCOME INSECURITY: HOW HARD IS IT FOR YOU TO PAY FOR THE VERY BASICS LIKE FOOD, HOUSING, MEDICAL CARE, AND HEATING?: NOT HARD AT ALL

## 2024-03-01 SDOH — ECONOMIC STABILITY: FOOD INSECURITY: WITHIN THE PAST 12 MONTHS, YOU WORRIED THAT YOUR FOOD WOULD RUN OUT BEFORE YOU GOT MONEY TO BUY MORE.: NEVER TRUE

## 2024-03-01 SDOH — ECONOMIC STABILITY: FOOD INSECURITY: WITHIN THE PAST 12 MONTHS, THE FOOD YOU BOUGHT JUST DIDN'T LAST AND YOU DIDN'T HAVE MONEY TO GET MORE.: NEVER TRUE

## 2024-03-01 ASSESSMENT — PATIENT HEALTH QUESTIONNAIRE - PHQ9
SUM OF ALL RESPONSES TO PHQ9 QUESTIONS 1 & 2: 0
2. FEELING DOWN, DEPRESSED OR HOPELESS: 0
SUM OF ALL RESPONSES TO PHQ QUESTIONS 1-9: 0
1. LITTLE INTEREST OR PLEASURE IN DOING THINGS: 0
SUM OF ALL RESPONSES TO PHQ QUESTIONS 1-9: 0

## 2024-03-01 ASSESSMENT — ENCOUNTER SYMPTOMS: ABDOMINAL PAIN: 0

## 2024-03-01 NOTE — PROGRESS NOTES
[facility]  DATE OF VISIT : 3/1/2024    Patient : Jose Clark   Age : 43 y.o.    : 1980   MRN : [id number]   ______________________________________________________________________    Chief Complaint :   Chief Complaint   Patient presents with    Gynecologic Exam     WWE/PAP       HPI : Jose Clark is 43 y.o. female who presented to the clinic today for WWE.     G 2P 2Ab 0.    Breast feeding Hx:2mos each  Menarche:13  Periods:  regular.  LMP/menopause:-   Sexually active : not currently .  OCP hx: never  Last Pap smear 3yrs ago. Previous Pap smears normal.   Fm hx of Breast cancer: no  Fm hx of Ovarian cancer: no  Fm hx of Endometrial cancer: no   Fm hx of Uterine cancer: no  Fm hx of Cervical cancer: no    Doing well.  No abdominal or pelvic pain. No dyspareunia.  No abnormal vaginal  bleeding. Does have vaginal discharge with intermittent suprapubic pain.   No urinary or GI symptoms.    Breast: No changes in skin, no lumps, no nipple inversion, bleeding or drainage, no axillary lymphadenopathy on self exam.       The patient was informed about the importance of regular gynecological  examination and pap smear. She was also  informed of the need for yearly mammogram after the age of 40.  After age 50 ,a colonoscopy should be scheduled through her primary care physician (PCP). This will help decrease the risk of colon cancer.  During the reproductive years, she should take folic acid daily in order to decrease the risk of neural tube defects such as spina bifida.  Adequate calcium and vitamin D intake should be added to a healthy diet ,and weight bearing exercise continued daily for improved cardiovascular and bone health.    All questions have been answered.      I reviewed the patient's past medications, allergies and past medical history during this visit.    Past Medical History :    Past Medical History:   Diagnosis Date    Gestational diabetes

## 2024-03-06 LAB — GYNECOLOGY CYTOLOGY REPORT: NORMAL

## 2024-03-07 LAB
HPV SAMPLE: NORMAL
HPV SOURCE: NORMAL
HPV, GENOTYPE 16: NOT DETECTED
HPV, GENOTYPE 18: NOT DETECTED
HPV, HIGH RISK OTHER: NOT DETECTED
HPV, INTERPRETATION: NORMAL

## 2024-04-26 ENCOUNTER — OFFICE VISIT (OUTPATIENT)
Age: 44
End: 2024-04-26
Payer: COMMERCIAL

## 2024-04-26 VITALS
HEART RATE: 88 BPM | SYSTOLIC BLOOD PRESSURE: 122 MMHG | DIASTOLIC BLOOD PRESSURE: 79 MMHG | RESPIRATION RATE: 16 BRPM | WEIGHT: 158.4 LBS | TEMPERATURE: 98.1 F | HEIGHT: 60 IN | OXYGEN SATURATION: 99 % | BODY MASS INDEX: 31.1 KG/M2

## 2024-04-26 DIAGNOSIS — E55.9 VITAMIN D DEFICIENCY: ICD-10-CM

## 2024-04-26 DIAGNOSIS — E03.9 ACQUIRED HYPOTHYROIDISM: ICD-10-CM

## 2024-04-26 DIAGNOSIS — E78.1 HYPERTRIGLYCERIDEMIA: ICD-10-CM

## 2024-04-26 DIAGNOSIS — R73.03 PREDIABETES: Primary | ICD-10-CM

## 2024-04-26 LAB — HBA1C MFR BLD: 6.4 %

## 2024-04-26 PROCEDURE — G8427 DOCREV CUR MEDS BY ELIG CLIN: HCPCS | Performed by: FAMILY MEDICINE

## 2024-04-26 PROCEDURE — 1036F TOBACCO NON-USER: CPT | Performed by: FAMILY MEDICINE

## 2024-04-26 PROCEDURE — G8417 CALC BMI ABV UP PARAM F/U: HCPCS | Performed by: FAMILY MEDICINE

## 2024-04-26 PROCEDURE — 83036 HEMOGLOBIN GLYCOSYLATED A1C: CPT | Performed by: FAMILY MEDICINE

## 2024-04-26 PROCEDURE — 99214 OFFICE O/P EST MOD 30 MIN: CPT | Performed by: FAMILY MEDICINE

## 2024-04-26 RX ORDER — MELATONIN
1000 DAILY
Qty: 90 TABLET | Refills: 1 | Status: SHIPPED | OUTPATIENT
Start: 2024-04-26

## 2024-04-26 ASSESSMENT — ENCOUNTER SYMPTOMS
DIARRHEA: 0
ABDOMINAL PAIN: 0
COUGH: 0
VOMITING: 0
NAUSEA: 0
CONSTIPATION: 0
WHEEZING: 0
SHORTNESS OF BREATH: 0

## 2024-04-26 NOTE — PROGRESS NOTES
assessment and plan.     All questions answered    Additional plan and future considerations:   RTO in 6 months    Return to Office: Return in about 6 months (around 10/26/2024) for Diabetes, POCT A1C.    Electronically signed by Natalie Pressley MD on 4/26/2024 at 5:03 PM

## 2024-06-24 ENCOUNTER — OFFICE VISIT (OUTPATIENT)
Dept: FAMILY MEDICINE CLINIC | Age: 44
End: 2024-06-24
Payer: COMMERCIAL

## 2024-06-24 VITALS
HEIGHT: 60 IN | HEART RATE: 94 BPM | SYSTOLIC BLOOD PRESSURE: 110 MMHG | DIASTOLIC BLOOD PRESSURE: 68 MMHG | OXYGEN SATURATION: 97 % | TEMPERATURE: 98.1 F | WEIGHT: 156 LBS | BODY MASS INDEX: 30.63 KG/M2

## 2024-06-24 DIAGNOSIS — J06.9 URI WITH COUGH AND CONGESTION: Primary | ICD-10-CM

## 2024-06-24 PROCEDURE — G8417 CALC BMI ABV UP PARAM F/U: HCPCS

## 2024-06-24 PROCEDURE — 1036F TOBACCO NON-USER: CPT

## 2024-06-24 PROCEDURE — G8427 DOCREV CUR MEDS BY ELIG CLIN: HCPCS

## 2024-06-24 PROCEDURE — 99213 OFFICE O/P EST LOW 20 MIN: CPT

## 2024-06-24 RX ORDER — BROMPHENIRAMINE MALEATE, PSEUDOEPHEDRINE HYDROCHLORIDE, AND DEXTROMETHORPHAN HYDROBROMIDE 2; 30; 10 MG/5ML; MG/5ML; MG/5ML
10 SYRUP ORAL 4 TIMES DAILY PRN
Qty: 400 ML | Refills: 0 | Status: SHIPPED | OUTPATIENT
Start: 2024-06-24 | End: 2024-07-04

## 2024-06-24 NOTE — PROGRESS NOTES
2024     Jose Clark 44 y.o. female    : 1980   Chief Complaint:   Headache (Sore throat from drainage, fever, headache, ear pain. Started on Saturday)      History of Present Illness   Source of history provided by:  patient.    Jose Clark is a 44 y.o. old female who presents to walk-in for evaluation of ear pain x 2 days. Associated symptoms include drainage, fever (Tmax 100), and headache.  Since onset symptoms have been consistent.  Patient has had no known Covid 19 exposure.  Patient has not been diagnosed with COVID-19 in the last 90 days.  Has taken acetaminophen/IBU at home with some symptomatic relief. Denies any chills, CP, dyspnea, LE edema, abdominal pain, nausea, vomiting, rash, dizziness, or lethargy. Denies any history of asthma, pneumonia, recurrent bronchitis or COPD.  They have no history of tobacco abuse. Pt declined COVID testing.        ROS   Past Medical History:   Past Medical History:   Diagnosis Date    Gestational diabetes 2015    Gestational diabetes 2016    States  told her to check blood glucose once a week, diet controlled    Thyroid disease     Patient states she is taking 100 mcg of Synthroid prescribed through her doctor in [place]    Trapezius strain 2021     Past Surgical History:  has a past surgical history that includes  section (, ).  Social History:  reports that she has never smoked. She has never used smokeless tobacco. She reports that she does not drink alcohol and does not use drugs.  Family History: family history includes Diabetes in her brother, brother, and mother.   Allergies: Patient has no known allergies.    Unless otherwise stated in this report the patient's positive and negative responses for review of systems for constitutional, eyes, ENT, cardiovascular, respiratory, gastrointestinal, neurological, , musculoskeletal, and integument systems and related systems to the presenting

## 2024-09-10 DIAGNOSIS — E03.9 HYPOTHYROIDISM, UNSPECIFIED TYPE: ICD-10-CM

## 2024-09-10 RX ORDER — LEVOTHYROXINE SODIUM 100 UG/1
100 TABLET ORAL DAILY
Qty: 90 TABLET | Refills: 1 | Status: SHIPPED | OUTPATIENT
Start: 2024-09-10

## 2024-11-04 ENCOUNTER — OFFICE VISIT (OUTPATIENT)
Dept: FAMILY MEDICINE CLINIC | Age: 44
End: 2024-11-04
Payer: COMMERCIAL

## 2024-11-04 VITALS
DIASTOLIC BLOOD PRESSURE: 62 MMHG | TEMPERATURE: 97.5 F | HEIGHT: 60 IN | RESPIRATION RATE: 18 BRPM | SYSTOLIC BLOOD PRESSURE: 110 MMHG | OXYGEN SATURATION: 98 % | WEIGHT: 153 LBS | HEART RATE: 96 BPM | BODY MASS INDEX: 30.04 KG/M2

## 2024-11-04 DIAGNOSIS — M25.572 ACUTE LEFT ANKLE PAIN: Primary | ICD-10-CM

## 2024-11-04 PROCEDURE — 99213 OFFICE O/P EST LOW 20 MIN: CPT

## 2024-11-04 PROCEDURE — G8417 CALC BMI ABV UP PARAM F/U: HCPCS

## 2024-11-04 PROCEDURE — G8484 FLU IMMUNIZE NO ADMIN: HCPCS

## 2024-11-04 PROCEDURE — G8427 DOCREV CUR MEDS BY ELIG CLIN: HCPCS

## 2024-11-04 PROCEDURE — 1036F TOBACCO NON-USER: CPT

## 2024-11-04 NOTE — PROGRESS NOTES
Chief Complaint:   Foot Pain      History of Present Illness   Source of history provided by:  patient.     Jose Clark is a 44 y.o. old female presenting to walk-in for evaluation of left ankle pain starting 2 months ago. Pt states there was a known traumatic injury that occurred while at work and notes pain in the ankle. Reports associated swelling and bruising.  Denies any paresthesias, knee pain, weakness, fever, chills, or abrasions. Pt states there is mild pain with ambulation. Has been taking OTC with minimal symptomatic relief. Denies any hx of previous injuries or surgeries at the site. She reports that she was seen at [facility] for this injury and told her \"a bone was out of place\". Pt denies receiving a splint or walking boot. ED told her, \"take OTC medication for pain\" and was discharged.     Review of Systems   Unless otherwise stated in this report or unable to obtain because of the patient's clinical or mental status as evidenced by the medical record, this patients's positive and negative responses for Review of Systems, constitutional, psych, eyes, ENT, cardiovascular, respiratory, gastrointestinal, neurological, genitourinary, musculoskeletal, integument systems and systems related to the presenting problem are either stated in the preceding or were negative for the symptoms and/or complaints related to the medical problem.    Past Medical History:  has a past medical history of Gestational diabetes, Gestational diabetes, Thyroid disease, and Trapezius strain.  Past Surgical History:  has a past surgical history that includes  section (2015, 2016).  Social History:  reports that she has never smoked. She has never used smokeless tobacco. She reports that she does not drink alcohol and does not use drugs.  Family History: family history includes Diabetes in her brother, brother, and mother.   Allergies: Patient has no known allergies.    Physical Exam   Vital Signs: /62

## 2024-12-26 ENCOUNTER — OFFICE VISIT (OUTPATIENT)
Dept: PRIMARY CARE CLINIC | Age: 44
End: 2024-12-26

## 2024-12-26 VITALS
SYSTOLIC BLOOD PRESSURE: 112 MMHG | RESPIRATION RATE: 18 BRPM | OXYGEN SATURATION: 99 % | DIASTOLIC BLOOD PRESSURE: 78 MMHG | BODY MASS INDEX: 31.22 KG/M2 | TEMPERATURE: 97.3 F | WEIGHT: 159 LBS | HEIGHT: 60 IN | HEART RATE: 87 BPM

## 2024-12-26 DIAGNOSIS — E78.1 PURE HYPERTRIGLYCERIDEMIA: ICD-10-CM

## 2024-12-26 DIAGNOSIS — E11.9 TYPE 2 DIABETES MELLITUS WITHOUT COMPLICATION, WITHOUT LONG-TERM CURRENT USE OF INSULIN (HCC): Primary | ICD-10-CM

## 2024-12-26 DIAGNOSIS — E03.9 ACQUIRED HYPOTHYROIDISM: ICD-10-CM

## 2024-12-26 PROBLEM — S46.819A TRAPEZIUS STRAIN: Status: RESOLVED | Noted: 2021-03-16 | Resolved: 2024-12-26

## 2024-12-26 PROBLEM — R42 VERTIGO: Status: RESOLVED | Noted: 2022-08-23 | Resolved: 2024-12-26

## 2024-12-26 LAB — HBA1C MFR BLD: 7.3 %

## 2024-12-26 RX ORDER — METFORMIN HYDROCHLORIDE 500 MG/1
500 TABLET, EXTENDED RELEASE ORAL 2 TIMES DAILY
Qty: 180 TABLET | Refills: 1
Start: 2024-12-26 | End: 2024-12-26

## 2024-12-26 RX ORDER — METFORMIN HYDROCHLORIDE 500 MG/1
500 TABLET, EXTENDED RELEASE ORAL 2 TIMES DAILY
Qty: 180 TABLET | Refills: 1 | Status: SHIPPED | OUTPATIENT
Start: 2024-12-26 | End: 2025-06-24

## 2024-12-26 RX ORDER — METFORMIN HYDROCHLORIDE 500 MG/1
TABLET, EXTENDED RELEASE ORAL
COMMUNITY
Start: 2024-12-18 | End: 2024-12-26 | Stop reason: SDUPTHER

## 2024-12-26 RX ORDER — LEVOTHYROXINE SODIUM 100 UG/1
100 TABLET ORAL DAILY
Qty: 90 TABLET | Refills: 1 | Status: SHIPPED | OUTPATIENT
Start: 2024-12-26

## 2024-12-26 NOTE — PROGRESS NOTES
[facility]  [address] [address]  Dept: [phone]  Dept Fax: [phone]   DATE OF VISIT : 2024      Patient:  Jose Clark  Age: 44 y.o.       : 1980      Chief complaint:   Chief Complaint   Patient presents with    Establish Care         History of Present Illness     Jose Clark is a 44 y.o. female who presented to the clinic today to establish care    Patient has a past medical history of type 2 diabetes, hypertriglyceridemia and hypothyroidism.  She reports taking her levothyroxine and metformin as prescribed.  Denies any major side effects to these medications.  Overall has had a well-balanced diet.  Reports going under a bit of stress due to the health of her her sibling.  Patient's overall hemoglobin A1c today was found to be 7.3.  This is an increase from her last check.  We did discuss the importance of a well-balanced diet and also beginning an routine exercise regimen.  Patient was in agreement for this.  Additionally patient has continued to take her levothyroxine as prescribed.  Denies missing any doses.  She will need a recheck of her TSH and T4 today.  Lastly for her hypertriglyceridemia patient has not started any medications for this.  We did discuss the importance of rechecking her lipid panel and assessing if she would need to start any medications afterwards.  She denies any headaches, dizziness or blurry vision at this time.      Medication List:    Current Outpatient Medications   Medication Sig Dispense Refill    levothyroxine (LEVOTHROID) 100 MCG tablet Take 1 tablet by mouth daily 90 tablet 1    metFORMIN (GLUCOPHAGE-XR) 500 MG extended release tablet Take 1 tablet by mouth in the morning and at bedtime 180 tablet 1    vitamin D (VITAMIN D3) 25 MCG (1000 UT) TABS tablet Take 1 tablet by mouth daily 90 tablet 1     No current facility-administered medications for this visit.

## 2025-01-15 DIAGNOSIS — E03.9 ACQUIRED HYPOTHYROIDISM: ICD-10-CM

## 2025-01-15 DIAGNOSIS — E78.1 PURE HYPERTRIGLYCERIDEMIA: ICD-10-CM

## 2025-01-15 DIAGNOSIS — E11.9 TYPE 2 DIABETES MELLITUS WITHOUT COMPLICATION, WITHOUT LONG-TERM CURRENT USE OF INSULIN (HCC): ICD-10-CM

## 2025-01-15 LAB
ANION GAP SERPL CALCULATED.3IONS-SCNC: 8 MMOL/L (ref 7–16)
BUN BLDV-MCNC: 11 MG/DL (ref 6–20)
CALCIUM SERPL-MCNC: 9.4 MG/DL (ref 8.6–10.2)
CHLORIDE BLD-SCNC: 101 MMOL/L (ref 98–107)
CHOLESTEROL, TOTAL: 204 MG/DL
CO2: 27 MMOL/L (ref 22–29)
CREAT SERPL-MCNC: 0.6 MG/DL (ref 0.5–1)
CREATININE URINE: 154.3 MG/DL (ref 29–226)
GFR, ESTIMATED: >90 ML/MIN/1.73M2
GLUCOSE BLD-MCNC: 136 MG/DL (ref 74–99)
HCT VFR BLD CALC: 40.2 % (ref 34–48)
HDLC SERPL-MCNC: 38 MG/DL
HEMOGLOBIN: 13.6 G/DL (ref 11.5–15.5)
LDL CHOLESTEROL: 97 MG/DL
MCH RBC QN AUTO: 30.8 PG (ref 26–35)
MCHC RBC AUTO-ENTMCNC: 33.8 G/DL (ref 32–34.5)
MCV RBC AUTO: 91 FL (ref 80–99.9)
MICROALBUMIN/CREAT 24H UR: <12 MG/L (ref 0–19)
MICROALBUMIN/CREAT UR-RTO: NORMAL MCG/MG CREAT (ref 0–30)
PDW BLD-RTO: 12.3 % (ref 11.5–15)
PLATELET # BLD: 304 K/UL (ref 130–450)
PMV BLD AUTO: 10.2 FL (ref 7–12)
POTASSIUM SERPL-SCNC: 4.4 MMOL/L (ref 3.5–5)
RBC # BLD: 4.42 M/UL (ref 3.5–5.5)
SODIUM BLD-SCNC: 136 MMOL/L (ref 132–146)
T4 FREE: 1.6 NG/DL (ref 0.9–1.7)
TRIGL SERPL-MCNC: 343 MG/DL
TSH SERPL DL<=0.05 MIU/L-ACNC: 1.95 UIU/ML (ref 0.27–4.2)
VLDLC SERPL CALC-MCNC: 69 MG/DL
WBC # BLD: 8.8 K/UL (ref 4.5–11.5)

## 2025-03-25 ENCOUNTER — RESULTS FOLLOW-UP (OUTPATIENT)
Dept: PRIMARY CARE CLINIC | Age: 45
End: 2025-03-25

## 2025-03-27 ENCOUNTER — OFFICE VISIT (OUTPATIENT)
Dept: PRIMARY CARE CLINIC | Age: 45
End: 2025-03-27
Payer: COMMERCIAL

## 2025-03-27 VITALS
WEIGHT: 155 LBS | TEMPERATURE: 97.5 F | HEIGHT: 60 IN | HEART RATE: 90 BPM | BODY MASS INDEX: 30.43 KG/M2 | SYSTOLIC BLOOD PRESSURE: 112 MMHG | OXYGEN SATURATION: 98 % | RESPIRATION RATE: 18 BRPM | DIASTOLIC BLOOD PRESSURE: 80 MMHG

## 2025-03-27 DIAGNOSIS — E11.9 TYPE 2 DIABETES MELLITUS WITHOUT COMPLICATION, WITHOUT LONG-TERM CURRENT USE OF INSULIN: Primary | ICD-10-CM

## 2025-03-27 DIAGNOSIS — K21.9 GASTROESOPHAGEAL REFLUX DISEASE WITHOUT ESOPHAGITIS: ICD-10-CM

## 2025-03-27 DIAGNOSIS — R74.01 TRANSAMINITIS: ICD-10-CM

## 2025-03-27 DIAGNOSIS — E03.9 ACQUIRED HYPOTHYROIDISM: ICD-10-CM

## 2025-03-27 PROBLEM — E78.00 HYPERCHOLESTEROLEMIA: Status: ACTIVE | Noted: 2023-06-26

## 2025-03-27 PROBLEM — E55.9 VITAMIN D DEFICIENCY: Status: ACTIVE | Noted: 2023-06-26

## 2025-03-27 LAB — HBA1C MFR BLD: 6.9 %

## 2025-03-27 PROCEDURE — 3044F HG A1C LEVEL LT 7.0%: CPT | Performed by: STUDENT IN AN ORGANIZED HEALTH CARE EDUCATION/TRAINING PROGRAM

## 2025-03-27 PROCEDURE — G8427 DOCREV CUR MEDS BY ELIG CLIN: HCPCS | Performed by: STUDENT IN AN ORGANIZED HEALTH CARE EDUCATION/TRAINING PROGRAM

## 2025-03-27 PROCEDURE — 83036 HEMOGLOBIN GLYCOSYLATED A1C: CPT | Performed by: STUDENT IN AN ORGANIZED HEALTH CARE EDUCATION/TRAINING PROGRAM

## 2025-03-27 PROCEDURE — 2022F DILAT RTA XM EVC RTNOPTHY: CPT | Performed by: STUDENT IN AN ORGANIZED HEALTH CARE EDUCATION/TRAINING PROGRAM

## 2025-03-27 PROCEDURE — G8417 CALC BMI ABV UP PARAM F/U: HCPCS | Performed by: STUDENT IN AN ORGANIZED HEALTH CARE EDUCATION/TRAINING PROGRAM

## 2025-03-27 PROCEDURE — G2211 COMPLEX E/M VISIT ADD ON: HCPCS | Performed by: STUDENT IN AN ORGANIZED HEALTH CARE EDUCATION/TRAINING PROGRAM

## 2025-03-27 PROCEDURE — 1036F TOBACCO NON-USER: CPT | Performed by: STUDENT IN AN ORGANIZED HEALTH CARE EDUCATION/TRAINING PROGRAM

## 2025-03-27 PROCEDURE — 99214 OFFICE O/P EST MOD 30 MIN: CPT | Performed by: STUDENT IN AN ORGANIZED HEALTH CARE EDUCATION/TRAINING PROGRAM

## 2025-03-27 RX ORDER — LEVOTHYROXINE SODIUM 100 UG/1
100 TABLET ORAL DAILY
Qty: 90 TABLET | Refills: 1 | Status: SHIPPED | OUTPATIENT
Start: 2025-03-27

## 2025-03-27 RX ORDER — OMEPRAZOLE 20 MG/1
20 CAPSULE, DELAYED RELEASE ORAL
Qty: 90 CAPSULE | Refills: 1 | Status: SHIPPED | OUTPATIENT
Start: 2025-03-27

## 2025-03-27 SDOH — ECONOMIC STABILITY: FOOD INSECURITY: WITHIN THE PAST 12 MONTHS, THE FOOD YOU BOUGHT JUST DIDN'T LAST AND YOU DIDN'T HAVE MONEY TO GET MORE.: NEVER TRUE

## 2025-03-27 SDOH — ECONOMIC STABILITY: FOOD INSECURITY: WITHIN THE PAST 12 MONTHS, YOU WORRIED THAT YOUR FOOD WOULD RUN OUT BEFORE YOU GOT MONEY TO BUY MORE.: NEVER TRUE

## 2025-03-27 ASSESSMENT — LIFESTYLE VARIABLES
HOW OFTEN DO YOU HAVE A DRINK CONTAINING ALCOHOL: 1
HOW OFTEN DO YOU HAVE A DRINK CONTAINING ALCOHOL: NEVER
HOW MANY STANDARD DRINKS CONTAINING ALCOHOL DO YOU HAVE ON A TYPICAL DAY: PATIENT DOES NOT DRINK
HOW MANY STANDARD DRINKS CONTAINING ALCOHOL DO YOU HAVE ON A TYPICAL DAY: 0
HOW OFTEN DO YOU HAVE SIX OR MORE DRINKS ON ONE OCCASION: 1

## 2025-03-27 ASSESSMENT — ANXIETY QUESTIONNAIRES
IF YOU CHECKED OFF ANY PROBLEMS ON THIS QUESTIONNAIRE, HOW DIFFICULT HAVE THESE PROBLEMS MADE IT FOR YOU TO DO YOUR WORK, TAKE CARE OF THINGS AT HOME, OR GET ALONG WITH OTHER PEOPLE: NOT DIFFICULT AT ALL
1. FEELING NERVOUS, ANXIOUS, OR ON EDGE: NOT AT ALL
6. BECOMING EASILY ANNOYED OR IRRITABLE: NOT AT ALL
7. FEELING AFRAID AS IF SOMETHING AWFUL MIGHT HAPPEN: NOT AT ALL
6. BECOMING EASILY ANNOYED OR IRRITABLE: NOT AT ALL
3. WORRYING TOO MUCH ABOUT DIFFERENT THINGS: NOT AT ALL
2. NOT BEING ABLE TO STOP OR CONTROL WORRYING: NOT AT ALL
4. TROUBLE RELAXING: NOT AT ALL
IF YOU CHECKED OFF ANY PROBLEMS ON THIS QUESTIONNAIRE, HOW DIFFICULT HAVE THESE PROBLEMS MADE IT FOR YOU TO DO YOUR WORK, TAKE CARE OF THINGS AT HOME, OR GET ALONG WITH OTHER PEOPLE: NOT DIFFICULT AT ALL
1. FEELING NERVOUS, ANXIOUS, OR ON EDGE: NOT AT ALL
3. WORRYING TOO MUCH ABOUT DIFFERENT THINGS: NOT AT ALL
4. TROUBLE RELAXING: NOT AT ALL
5. BEING SO RESTLESS THAT IT IS HARD TO SIT STILL: NOT AT ALL
5. BEING SO RESTLESS THAT IT IS HARD TO SIT STILL: NOT AT ALL
GAD7 TOTAL SCORE: 0
2. NOT BEING ABLE TO STOP OR CONTROL WORRYING: NOT AT ALL
7. FEELING AFRAID AS IF SOMETHING AWFUL MIGHT HAPPEN: NOT AT ALL

## 2025-03-27 ASSESSMENT — PATIENT HEALTH QUESTIONNAIRE - PHQ9
SUM OF ALL RESPONSES TO PHQ QUESTIONS 1-9: 0
1. LITTLE INTEREST OR PLEASURE IN DOING THINGS: NOT AT ALL
SUM OF ALL RESPONSES TO PHQ QUESTIONS 1-9: 0
SUM OF ALL RESPONSES TO PHQ QUESTIONS 1-9: 0
2. FEELING DOWN, DEPRESSED OR HOPELESS: NOT AT ALL
SUM OF ALL RESPONSES TO PHQ QUESTIONS 1-9: 0

## 2025-03-27 NOTE — PROGRESS NOTES
[facility]  [address] [address]  Dept: [phone]  Dept Fax: [phone]   DATE OF VISIT : 3/27/2025      Patient:  Jose Clark  Age: 44 y.o.       : 1980      Chief complaint:   Chief Complaint   Patient presents with    Diabetes         History of Present Illness     Jose Clark is a 44 y.o. female who presented to the clinic today for diabetes    Patient reports taking her metformin as prescribed and has not experienced any major side effects to this.  Has overall had a well-balanced diet.  No major changes to her overall weight.  Her hemoglobin A1c today was 6.9.    Additionally patient reports concerns with her liver.  Reports receiving a phone call from her previous provider mentioning that she has to follow-up with a gastroenterologist for further workup.  We currently do not have any previous records of LFTs.  I did inform her that we should obtain labs at this time for further evaluation.  She denies any nausea, emesis, chest pain or changes in overall bowel habits.    As for her thyroid patient has continued to take levothyroxine as prescribed.  Her last TSH back in 2025 showed a well-controlled hypothyroidism.   she continues to take medication by itself first in the morning.    Lastly patient has experienced some intermittent coughing and sore throat especially in the morning and at bedtime.  She did attempt to take an allergy medication to minimal alleviation.  Denies any additional symptoms.    Medication List:    Current Outpatient Medications   Medication Sig Dispense Refill    levothyroxine (LEVOTHROID) 100 MCG tablet Take 1 tablet by mouth daily 90 tablet 1    omeprazole (PRILOSEC) 20 MG delayed release capsule Take 1 capsule by mouth every morning (before breakfast) 90 capsule 1    metFORMIN (GLUCOPHAGE-XR) 500 MG extended release tablet Take 1 tablet by mouth in the morning and at

## 2025-05-01 DIAGNOSIS — R74.01 TRANSAMINITIS: ICD-10-CM

## 2025-05-01 LAB
ALBUMIN: 4.3 G/DL (ref 3.5–5.2)
ALP BLD-CCNC: 86 U/L (ref 35–104)
ALT SERPL-CCNC: 67 U/L (ref 0–35)
AST SERPL-CCNC: 32 U/L (ref 0–35)
BILIRUB SERPL-MCNC: 0.2 MG/DL (ref 0–1.2)
BILIRUBIN DIRECT: 0.1 MG/DL (ref 0–0.2)
BILIRUBIN, INDIRECT: 0.1 MG/DL (ref 0–1)
TOTAL PROTEIN: 7.6 G/DL (ref 6.4–8.3)

## 2025-05-02 ENCOUNTER — RESULTS FOLLOW-UP (OUTPATIENT)
Dept: FAMILY MEDICINE CLINIC | Age: 45
End: 2025-05-02

## 2025-07-03 ENCOUNTER — OFFICE VISIT (OUTPATIENT)
Dept: PRIMARY CARE CLINIC | Age: 45
End: 2025-07-03
Payer: COMMERCIAL

## 2025-07-03 VITALS
TEMPERATURE: 97.8 F | HEART RATE: 81 BPM | WEIGHT: 154 LBS | OXYGEN SATURATION: 97 % | DIASTOLIC BLOOD PRESSURE: 74 MMHG | BODY MASS INDEX: 30.23 KG/M2 | SYSTOLIC BLOOD PRESSURE: 120 MMHG | HEIGHT: 60 IN

## 2025-07-03 DIAGNOSIS — E11.9 TYPE 2 DIABETES MELLITUS WITHOUT COMPLICATION, WITHOUT LONG-TERM CURRENT USE OF INSULIN (HCC): Primary | ICD-10-CM

## 2025-07-03 DIAGNOSIS — E03.9 ACQUIRED HYPOTHYROIDISM: ICD-10-CM

## 2025-07-03 LAB — HBA1C MFR BLD: 6.5 %

## 2025-07-03 PROCEDURE — 2022F DILAT RTA XM EVC RTNOPTHY: CPT | Performed by: STUDENT IN AN ORGANIZED HEALTH CARE EDUCATION/TRAINING PROGRAM

## 2025-07-03 PROCEDURE — 1036F TOBACCO NON-USER: CPT | Performed by: STUDENT IN AN ORGANIZED HEALTH CARE EDUCATION/TRAINING PROGRAM

## 2025-07-03 PROCEDURE — 3044F HG A1C LEVEL LT 7.0%: CPT | Performed by: STUDENT IN AN ORGANIZED HEALTH CARE EDUCATION/TRAINING PROGRAM

## 2025-07-03 PROCEDURE — G8417 CALC BMI ABV UP PARAM F/U: HCPCS | Performed by: STUDENT IN AN ORGANIZED HEALTH CARE EDUCATION/TRAINING PROGRAM

## 2025-07-03 PROCEDURE — 99214 OFFICE O/P EST MOD 30 MIN: CPT | Performed by: STUDENT IN AN ORGANIZED HEALTH CARE EDUCATION/TRAINING PROGRAM

## 2025-07-03 PROCEDURE — 83036 HEMOGLOBIN GLYCOSYLATED A1C: CPT | Performed by: STUDENT IN AN ORGANIZED HEALTH CARE EDUCATION/TRAINING PROGRAM

## 2025-07-03 PROCEDURE — G8427 DOCREV CUR MEDS BY ELIG CLIN: HCPCS | Performed by: STUDENT IN AN ORGANIZED HEALTH CARE EDUCATION/TRAINING PROGRAM

## 2025-07-03 RX ORDER — LEVOTHYROXINE SODIUM 100 UG/1
100 TABLET ORAL DAILY
Qty: 90 TABLET | Refills: 1 | Status: SHIPPED | OUTPATIENT
Start: 2025-07-03

## 2025-07-03 RX ORDER — METFORMIN HYDROCHLORIDE 500 MG/1
500 TABLET, EXTENDED RELEASE ORAL 2 TIMES DAILY
Qty: 180 TABLET | Refills: 1 | Status: SHIPPED | OUTPATIENT
Start: 2025-07-03 | End: 2025-12-30

## 2025-07-03 NOTE — PROGRESS NOTES
[facility]  [address] [address]  Dept: [phone]  Dept Fax: [phone]   DATE OF VISIT : 7/3/2025      Patient:  Jose Clark  Age: 45 y.o.       : 1980      Chief complaint: No chief complaint on file.        History of Present Illness     Jose Clark is a 45 y.o. female who presented to the clinic today for type 2 diabetes    Patient has been doing overall well since her last office visit.  Does report having a well-balanced diet and denies any significant elevated or low glucose levels.  Her hemoglobin A1c has improved today 6.5.  Denies any major side effects of the metformin.    Additionally patient thyroid has been well-controlled with 100 mcg of levothyroxine.  She denies any major changes to her weight.  Denies any heat or cold intolerance.  Last TSH's back in 2025 was in normal range.      Medication List:    Current Outpatient Medications   Medication Sig Dispense Refill    metFORMIN (GLUCOPHAGE-XR) 500 MG extended release tablet Take 1 tablet by mouth in the morning and at bedtime 180 tablet 1    levothyroxine (LEVOTHROID) 100 MCG tablet Take 1 tablet by mouth daily 90 tablet 1    omeprazole (PRILOSEC) 20 MG delayed release capsule Take 1 capsule by mouth every morning (before breakfast) 90 capsule 1    vitamin D (VITAMIN D3) 25 MCG (1000 UT) TABS tablet Take 1 tablet by mouth daily 90 tablet 1     No current facility-administered medications for this visit.            ROS   Reviewed as above, otherwise negative       Physical Exam   Vitals:   Vitals:    25 1511   BP: 120/74   Pulse: 81   Temp: 97.8 °F (36.6 °C)   SpO2: 97%       Physical Exam  Vitals reviewed.   Constitutional:       Appearance: Normal appearance.   HENT:      Head: Normocephalic and atraumatic.   Skin:     General: Skin is warm.   Neurological:      Mental Status: She is alert.   Psychiatric:         Mood and Affect: